# Patient Record
Sex: FEMALE | Race: WHITE | HISPANIC OR LATINO | URBAN - METROPOLITAN AREA
[De-identification: names, ages, dates, MRNs, and addresses within clinical notes are randomized per-mention and may not be internally consistent; named-entity substitution may affect disease eponyms.]

---

## 2018-01-16 ENCOUNTER — OUTPATIENT (OUTPATIENT)
Dept: OUTPATIENT SERVICES | Facility: HOSPITAL | Age: 63
LOS: 1 days | Discharge: HOME | End: 2018-01-16

## 2018-01-16 DIAGNOSIS — G51.0 BELL'S PALSY: ICD-10-CM

## 2018-01-16 DIAGNOSIS — R19.7 DIARRHEA, UNSPECIFIED: ICD-10-CM

## 2018-01-16 DIAGNOSIS — Z01.818 ENCOUNTER FOR OTHER PREPROCEDURAL EXAMINATION: ICD-10-CM

## 2018-01-16 DIAGNOSIS — A04.72 ENTEROCOLITIS DUE TO CLOSTRIDIUM DIFFICILE, NOT SPECIFIED AS RECURRENT: ICD-10-CM

## 2018-01-16 DIAGNOSIS — G97.1 OTHER REACTION TO SPINAL AND LUMBAR PUNCTURE: ICD-10-CM

## 2018-01-17 DIAGNOSIS — T85.44XD CAPSULAR CONTRACTURE OF BREAST IMPLANT, SUBSEQUENT ENCOUNTER: ICD-10-CM

## 2018-01-17 DIAGNOSIS — K21.9 GASTRO-ESOPHAGEAL REFLUX DISEASE WITHOUT ESOPHAGITIS: ICD-10-CM

## 2018-01-17 DIAGNOSIS — M19.90 UNSPECIFIED OSTEOARTHRITIS, UNSPECIFIED SITE: ICD-10-CM

## 2018-01-23 ENCOUNTER — OUTPATIENT (OUTPATIENT)
Dept: OUTPATIENT SERVICES | Facility: HOSPITAL | Age: 63
LOS: 1 days | Discharge: HOME | End: 2018-01-23

## 2018-01-29 DIAGNOSIS — F17.210 NICOTINE DEPENDENCE, CIGARETTES, UNCOMPLICATED: ICD-10-CM

## 2018-01-29 DIAGNOSIS — J44.9 CHRONIC OBSTRUCTIVE PULMONARY DISEASE, UNSPECIFIED: ICD-10-CM
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
## 2018-01-29 DIAGNOSIS — T85.44XA CAPSULAR CONTRACTURE OF BREAST IMPLANT, INITIAL ENCOUNTER: ICD-10-CM

## 2018-02-04 DIAGNOSIS — R19.7 DIARRHEA, UNSPECIFIED: ICD-10-CM

## 2018-02-04 DIAGNOSIS — G51.0 BELL'S PALSY: ICD-10-CM

## 2018-02-04 DIAGNOSIS — G97.1 OTHER REACTION TO SPINAL AND LUMBAR PUNCTURE: ICD-10-CM

## 2018-02-04 DIAGNOSIS — A04.72 ENTEROCOLITIS DUE TO CLOSTRIDIUM DIFFICILE, NOT SPECIFIED AS RECURRENT: ICD-10-CM

## 2019-02-23 ENCOUNTER — INPATIENT (INPATIENT)
Facility: HOSPITAL | Age: 64
LOS: 2 days | Discharge: HOME | End: 2019-02-26
Attending: INTERNAL MEDICINE | Admitting: INTERNAL MEDICINE

## 2019-02-23 VITALS
OXYGEN SATURATION: 98 % | TEMPERATURE: 97 F | WEIGHT: 134.92 LBS | DIASTOLIC BLOOD PRESSURE: 95 MMHG | HEIGHT: 63 IN | SYSTOLIC BLOOD PRESSURE: 142 MMHG | RESPIRATION RATE: 18 BRPM | HEART RATE: 88 BPM

## 2019-02-23 DIAGNOSIS — Z98.890 OTHER SPECIFIED POSTPROCEDURAL STATES: Chronic | ICD-10-CM

## 2019-02-23 DIAGNOSIS — Z90.710 ACQUIRED ABSENCE OF BOTH CERVIX AND UTERUS: Chronic | ICD-10-CM

## 2019-02-23 DIAGNOSIS — Z98.82 BREAST IMPLANT STATUS: Chronic | ICD-10-CM

## 2019-02-23 LAB
ALBUMIN SERPL ELPH-MCNC: 4.4 G/DL — SIGNIFICANT CHANGE UP (ref 3.5–5.2)
ALP SERPL-CCNC: 75 U/L — SIGNIFICANT CHANGE UP (ref 30–115)
ALT FLD-CCNC: 52 U/L — HIGH (ref 0–41)
ANION GAP SERPL CALC-SCNC: 16 MMOL/L — HIGH (ref 7–14)
AST SERPL-CCNC: 39 U/L — SIGNIFICANT CHANGE UP (ref 0–41)
BASOPHILS # BLD AUTO: 0.07 K/UL — SIGNIFICANT CHANGE UP (ref 0–0.2)
BASOPHILS NFR BLD AUTO: 0.8 % — SIGNIFICANT CHANGE UP (ref 0–1)
BILIRUB SERPL-MCNC: 0.2 MG/DL — SIGNIFICANT CHANGE UP (ref 0.2–1.2)
BUN SERPL-MCNC: 17 MG/DL — SIGNIFICANT CHANGE UP (ref 10–20)
CALCIUM SERPL-MCNC: 9.9 MG/DL — SIGNIFICANT CHANGE UP (ref 8.5–10.1)
CHLORIDE SERPL-SCNC: 102 MMOL/L — SIGNIFICANT CHANGE UP (ref 98–110)
CO2 SERPL-SCNC: 23 MMOL/L — SIGNIFICANT CHANGE UP (ref 17–32)
CREAT SERPL-MCNC: 0.8 MG/DL — SIGNIFICANT CHANGE UP (ref 0.7–1.5)
EOSINOPHIL # BLD AUTO: 0.14 K/UL — SIGNIFICANT CHANGE UP (ref 0–0.7)
EOSINOPHIL NFR BLD AUTO: 1.5 % — SIGNIFICANT CHANGE UP (ref 0–8)
GLUCOSE SERPL-MCNC: 104 MG/DL — HIGH (ref 70–99)
HCT VFR BLD CALC: 44.4 % — SIGNIFICANT CHANGE UP (ref 37–47)
HGB BLD-MCNC: 14.9 G/DL — SIGNIFICANT CHANGE UP (ref 12–16)
IMM GRANULOCYTES NFR BLD AUTO: 0.2 % — SIGNIFICANT CHANGE UP (ref 0.1–0.3)
LYMPHOCYTES # BLD AUTO: 3.54 K/UL — HIGH (ref 1.2–3.4)
LYMPHOCYTES # BLD AUTO: 38.5 % — SIGNIFICANT CHANGE UP (ref 20.5–51.1)
MAGNESIUM SERPL-MCNC: 2 MG/DL — SIGNIFICANT CHANGE UP (ref 1.8–2.4)
MCHC RBC-ENTMCNC: 33.2 PG — HIGH (ref 27–31)
MCHC RBC-ENTMCNC: 33.6 G/DL — SIGNIFICANT CHANGE UP (ref 32–37)
MCV RBC AUTO: 98.9 FL — SIGNIFICANT CHANGE UP (ref 81–99)
MONOCYTES # BLD AUTO: 0.8 K/UL — HIGH (ref 0.1–0.6)
MONOCYTES NFR BLD AUTO: 8.7 % — SIGNIFICANT CHANGE UP (ref 1.7–9.3)
NEUTROPHILS # BLD AUTO: 4.63 K/UL — SIGNIFICANT CHANGE UP (ref 1.4–6.5)
NEUTROPHILS NFR BLD AUTO: 50.3 % — SIGNIFICANT CHANGE UP (ref 42.2–75.2)
NRBC # BLD: 0 /100 WBCS — SIGNIFICANT CHANGE UP (ref 0–0)
PLATELET # BLD AUTO: 221 K/UL — SIGNIFICANT CHANGE UP (ref 130–400)
POTASSIUM SERPL-MCNC: 4.3 MMOL/L — SIGNIFICANT CHANGE UP (ref 3.5–5)
POTASSIUM SERPL-SCNC: 4.3 MMOL/L — SIGNIFICANT CHANGE UP (ref 3.5–5)
PROT SERPL-MCNC: 7.2 G/DL — SIGNIFICANT CHANGE UP (ref 6–8)
RBC # BLD: 4.49 M/UL — SIGNIFICANT CHANGE UP (ref 4.2–5.4)
RBC # FLD: 13 % — SIGNIFICANT CHANGE UP (ref 11.5–14.5)
SODIUM SERPL-SCNC: 141 MMOL/L — SIGNIFICANT CHANGE UP (ref 135–146)
TROPONIN T SERPL-MCNC: <0.01 NG/ML — SIGNIFICANT CHANGE UP
WBC # BLD: 9.2 K/UL — SIGNIFICANT CHANGE UP (ref 4.8–10.8)
WBC # FLD AUTO: 9.2 K/UL — SIGNIFICANT CHANGE UP (ref 4.8–10.8)

## 2019-02-23 RX ORDER — PANTOPRAZOLE SODIUM 20 MG/1
40 TABLET, DELAYED RELEASE ORAL
Qty: 0 | Refills: 0 | Status: DISCONTINUED | OUTPATIENT
Start: 2019-02-23 | End: 2019-02-26

## 2019-02-23 RX ORDER — SODIUM CHLORIDE 9 MG/ML
1000 INJECTION INTRAMUSCULAR; INTRAVENOUS; SUBCUTANEOUS ONCE
Qty: 0 | Refills: 0 | Status: COMPLETED | OUTPATIENT
Start: 2019-02-23 | End: 2019-02-23

## 2019-02-23 RX ORDER — ASPIRIN/CALCIUM CARB/MAGNESIUM 324 MG
81 TABLET ORAL DAILY
Qty: 0 | Refills: 0 | Status: DISCONTINUED | OUTPATIENT
Start: 2019-02-23 | End: 2019-02-26

## 2019-02-23 RX ORDER — ENOXAPARIN SODIUM 100 MG/ML
40 INJECTION SUBCUTANEOUS EVERY 24 HOURS
Qty: 0 | Refills: 0 | Status: DISCONTINUED | OUTPATIENT
Start: 2019-02-23 | End: 2019-02-26

## 2019-02-23 RX ORDER — ASPIRIN/CALCIUM CARB/MAGNESIUM 324 MG
325 TABLET ORAL ONCE
Qty: 0 | Refills: 0 | Status: COMPLETED | OUTPATIENT
Start: 2019-02-23 | End: 2019-02-23

## 2019-02-23 RX ADMIN — Medication 325 MILLIGRAM(S): at 20:08

## 2019-02-23 RX ADMIN — SODIUM CHLORIDE 1000 MILLILITER(S): 9 INJECTION INTRAMUSCULAR; INTRAVENOUS; SUBCUTANEOUS at 15:52

## 2019-02-23 RX ADMIN — SODIUM CHLORIDE 1000 MILLILITER(S): 9 INJECTION INTRAMUSCULAR; INTRAVENOUS; SUBCUTANEOUS at 14:39

## 2019-02-23 RX ADMIN — Medication 58 MILLIGRAM(S): at 15:55

## 2019-02-23 RX ADMIN — ENOXAPARIN SODIUM 40 MILLIGRAM(S): 100 INJECTION SUBCUTANEOUS at 21:46

## 2019-02-23 NOTE — H&P ADULT - NSHPSOCIALHISTORY_GEN_ALL_CORE
Marital Status:  (x )    (   ) Single    (   )    (  )   Occupation: retired  Lives with: (  ) alone  (  ) children   (x) spouse   (  ) parents  (  ) other    Substance Use (street drugs): (x) never used  (  ) other:  Tobacco Usage:  (   ) never smoked   (   ) former smoker   (4 cigarettes x40yrs) current smoker  (     ) pack year  (        ) last cigarette date  Alcohol Usage: occasional

## 2019-02-23 NOTE — ED PROVIDER NOTE - OBJECTIVE STATEMENT
62yo F with PMHx MS not on any meds p/w chest pressure associated with right arm and right neck pain that started today. Feels like something is stuck in her mid chest. Additionally notes that 3 days ago, began having right facial droop, blurry vision (found to be worse on right in ED), and in ED noted to have tongue deviation to the right. Pt did not come to ED initially for these symptoms because she has had similar in the past with her MS but does note it does not usually persist this long. Denies numbness, tingling, focal weakness, slurred speech. Pt is ex-smoker, states she has chronic cough. Denies fever, headache, lightheadedness, SOB, nausea, vomiting, diarrhea, abd pain, dysuria, hematuria, leg swelling.

## 2019-02-23 NOTE — ED PROVIDER NOTE - CARE PLAN
Principal Discharge DX:	Multiple sclerosis  Secondary Diagnosis:	Chest pain  Secondary Diagnosis:	Left bundle branch block

## 2019-02-23 NOTE — H&P ADULT - NSHPPHYSICALEXAM_GEN_ALL_CORE
T(F): 98 (02-23-19 @ 19:41), Max: 98 (02-23-19 @ 19:41)  HR: 67 (02-23-19 @ 19:41) (67 - 88)  BP: 128/59 (02-23-19 @ 19:41) (128/59 - 162/75)  RR: 18 (02-23-19 @ 19:41) (18 - 18)  SpO2: 98% (02-23-19 @ 19:41) (98% - 98%)  PHYSICAL EXAM:  GENERAL: NAD, speaks in full sentences, no signs of respiratory distress  HEAD:  R sided facial droop   EYES: EOMI, PERRLA, conjunctiva clear  NECK: Supple, No JVD  CHEST/LUNG: Clear to auscultation bilaterally; No wheeze; No crackles; No accessory muscles used  HEART: Regular rate and rhythm; No murmurs;   ABDOMEN: Soft, Nontender, Nondistended; Bowel sounds present; No guarding  EXTREMITIES:  2+ Peripheral Pulses, No cyanosis or edema,   PSYCH: AAOx3  NEUROLOGY: non-focal, 5/5 strength in all extremities, sensation intact, No nystagmus. No ataxia  SKIN: No rashes or lesions

## 2019-02-23 NOTE — H&P ADULT - ASSESSMENT
63Y F with pmh of multiple sclerosis presents to the ED with R sided facial droop for 3 days and chest pain for 1 day.    R sided facial droop with neck pain and blurry vision in R eye likely 2/2 MS exacerbation vs stroke  -Admit to telemetry  -Start on Methylprednisolone 1000mg q24h  -Neurology consulted  -Will get MRI brain and cervicale spine with and without gadolinium   -check lipids and if stroke is present on MRI will add statin to baby aspirin    Chest pain r/o ACS vs GERD  -EKG shows new LBBB  -CE x1 is negative. will repeat 2 more sets  -2 D echo  -Cardiology consult  -no current chest pain     DVT ppx  -lovenox    GI ppx  -since on high dose steroids will start protonix ppx    Dispo: Patient is from home.

## 2019-02-23 NOTE — ED PROVIDER NOTE - CLINICAL SUMMARY MEDICAL DECISION MAKING FREE TEXT BOX
pw facial droop and other cranial nerve deficits, along with chest pain. EKG shows new LBBB. Trop neg. Neuro recs noted. Patient to be admitted to an inpatient telemetry floor. Case discussed with and care endorsed to medical admitting resident. Admitting physician notified.

## 2019-02-23 NOTE — ED ADULT TRIAGE NOTE - CHIEF COMPLAINT QUOTE
blurry vision for 3 days with neck pain, right side facial droop that started 3 days ago and feels worse today with neck pain blurry vision for 3 days with neck pain, right side facial droop that started 3 days ago and feels worse today also states she has pain in her right arm that started 3 days ago as well

## 2019-02-23 NOTE — ED PROVIDER NOTE - NS ED ROS FT
Constitutional: No fever, chills.  Eyes:  No visual changes  ENMT:  +neck pain  Cardiac:  +chest pain  Respiratory:  No cough, SOB  GI:  No nausea, vomiting, diarrhea, abdominal pain.  :  No dysuria, hematuria  MS:  No back pain. +right arm pain  Neuro:  No headache or lightheadedness. +facial droop. See HPI  Skin:  No skin rash  Endocrine: No history of thyroid disease or diabetes.  Except as documented in the HPI,  all other systems are negative.

## 2019-02-23 NOTE — ED PROVIDER NOTE - PROGRESS NOTE DETAILS
ATTENDING NOTE:   62 y/o with PMH of MS, previously on injection medications however could not remain compliant due to side effects, no flares in prior >1 year, presenting with 3 days of right sided facial droop and pain radiating to right lateral neck to RUE. No numbness or tingling. Family notes no speech change. No fever, chills, dysuria, hematuria or vomiting. Pt is also c/o CP feeling like pressure and indigestion which has not resolved with Nexium taken at home. CP is associated with nausea and dizziness. PE: normal HEENT. Heart RRR. Lungs CTAB. Abdomen soft NTND. Extremities 2+ b/l pulses intact. Neuro (+) R facial partial paresis upper<lower with R tongue deviation. (+) decreased strength in right CN XI function. (+) neck paraspinal tenderness at lever C3-C4 on right. No midline tenderness. No step off. Normal trunk function. Otherwise normal speech, strength 5/5 all extremities, sensation grossly intact. No nystagmus. No ataxia. A/P: Concern for MS flare, given new neuro deficits, outside the window for stroke code and is less likely stroke. Eval ACS given chest pressure, nausea and light headedness. EKG showing new LBB compared to prior 1 year ago. Admission Spoke to Dr. Lee requesting IV Solumedrol 1g daily and MRI brain and c-spine.

## 2019-02-23 NOTE — ED ADULT NURSE NOTE - CHIEF COMPLAINT QUOTE
blurry vision for 3 days with neck pain, right side facial droop that started 3 days ago and feels worse today also states she has pain in her right arm that started 3 days ago as well

## 2019-02-23 NOTE — H&P ADULT - HISTORY OF PRESENT ILLNESS
63Y F with pmh of multiple sclerosis presents to the ED with R sided facial droop for 3 days and chest pain for 1 day. As per patient she was diagnosed with MS in 2013 when she developed facial droop. She follow with a neurologist in [place] and was given IV injections which patient says made her feel very sick so she stopped taking them for the past 2 years which was also the last time she saw her neurologist. Patient said the facial droop developed suddenly and was associated with R neck and arm pain. She developed blurry vision in the R eye as well yesterday. Her chest pain started like a heartburn for which she took nexium but it did not resolve the pain. The pain is pressure like and moves down the R arm and was worse with exertion which started yesterday. Her cardiologist is Dr Jackson and she had a stress test done 3 months ago which did not show any abnormality. On this admission EKG showed a LBBB which is new. Currently patient denies any chest pain. She denies any sob, nausea, vomiting, diarrhea, numbness, fevers or chills.

## 2019-02-23 NOTE — ED ADULT NURSE NOTE - OBJECTIVE STATEMENT
Patient present to ED with complains of right sided facial droop for about 2 days, with pain of right UE, denies slurred speech, nausea, vomiting, headaches, and dizziness.

## 2019-02-23 NOTE — H&P ADULT - ATTENDING COMMENTS
Patient is seen and examined independently at bedside. I agree with the resident's note, history and plan as above.    PHYSICAL EXAM:  CONSTITUTIONAL: NAD, well-groomed, well-developed.  HEAD:  Atraumatic, Normocephalic.  EYES: EOMI, PERRLA, conjunctiva and sclera clear. Right eye blurry vision. Right sided facial droop involving the makenna orbital area and lower part of face.  ENMT: Moist mucous membranes, No lesions. Right sided tongue deviation  NERVOUS SYSTEM:  Alert & Oriented X3, Good concentration; No limb weakness. Tingling present over face. Right hand tingling resolved as per patient.  RESPIRATORY: Clear to ascultation bilaterally; No rales, rhonchi, wheezing, or rubs.  CARDIOVASCULAR: Regular rate and rhythm; No murmurs, rubs, or gallops.  GASTROINTESTINAL: Soft, Nontender, Nondistended; Bowel sounds present.  MUSCULOSKELETAL: No joint swelling or tenderness. No neck or back pain.  EXTREMITIES: No clubbing, cyanosis, or edema.  LYMPH: No cervical lymphadenopathy noted.  SKIN: No rashes or lesions.    CXR reviewed, no focal opacity or pleural effusion    # MS exacerbation associated with right sided facial droop with neck pain and right eye blurry vision  - rule out stroke  - on telemetry  - solumedrol 1 g IV q24h for 3 days  - follow up neurology  - order for MRI brain and cervicale spine with and without gadolinium, 2D Echo  - c/w aspirin and statin    # Atypical Chest and arm pain, currently resolved   - r/o ACS vs. MS related symptoms vs. GERD  - EKG reviewed, normal sinus rhythm, new LBBB  - order for NM adenosine stress test for tomorrow, 2D Echo  - follow up Cardiology    # DVT Prophylaxis - on lovneox subcut    All labs, radiologic studies, vitals, orders and medications list reviewed.

## 2019-02-23 NOTE — H&P ADULT - NSHPLABSRESULTS_GEN_ALL_CORE
14.9   9.20  )-----------( 221      ( 23 Feb 2019 14:33 )             44.4       02-23    141  |  102  |  17  ----------------------------<  104<H>  4.3   |  23  |  0.8    Ca    9.9      23 Feb 2019 14:33  Mg     2.0     02-23    TPro  7.2  /  Alb  4.4  /  TBili  0.2  /  DBili  x   /  AST  39  /  ALT  52<H>  /  AlkPhos  75  02-23          CARDIAC MARKERS ( 23 Feb 2019 14:33 )  x     / <0.01 ng/mL / x     / x     / x

## 2019-02-23 NOTE — ED PROVIDER NOTE - PHYSICAL EXAMINATION
Vital signs reviewed  GENERAL: Patient nontoxic appearing, NAD  HEAD: NCAT  EYES: Anicteric. PERRL, EOMI. Visual acuity 20/30 on right and 20/20 on left.   ENT: MMM  NECK: Supple. Mild right paraspinal TTP, no midline TTP  RESPIRATORY: Normal respiratory effort. CTA B/L. No wheezing, rales, rhonchi  CARDIOVASCULAR: Regular rate and rhythm  ABDOMEN: Soft. Nondistended. Nontender. No guarding or rebound. No CVA tenderness  MUSCULOSKELETAL/EXTREMITIES: Brisk cap refill. 2+ radial pulses. No leg edema.  SKIN:  Warm and dry  NEURO: AAOx3. GCS 15. Speech clear and coherent. Answering questions appropriately. Slight right facial droop. Right tongue deviation. Strength 5/5 x4, no drift. Ambulating normally, no gait abnormality. No gross FND.   PSYCHIATRIC: Cooperative. Affect appropriate.

## 2019-02-24 LAB
CHOLEST SERPL-MCNC: 239 MG/DL — HIGH (ref 100–200)
CK MB CFR SERPL CALC: 1.8 NG/ML — SIGNIFICANT CHANGE UP (ref 0.6–6.3)
CK MB CFR SERPL CALC: 2 NG/ML — SIGNIFICANT CHANGE UP (ref 0.6–6.3)
CK SERPL-CCNC: 79 U/L — SIGNIFICANT CHANGE UP (ref 0–225)
CK SERPL-CCNC: 82 U/L — SIGNIFICANT CHANGE UP (ref 0–225)
HDLC SERPL-MCNC: 74 MG/DL — SIGNIFICANT CHANGE UP
LIPID PNL WITH DIRECT LDL SERPL: 172 MG/DL — HIGH (ref 4–129)
TOTAL CHOLESTEROL/HDL RATIO MEASUREMENT: 3.2 RATIO — LOW (ref 4–5.5)
TRIGL SERPL-MCNC: 69 MG/DL — SIGNIFICANT CHANGE UP (ref 10–149)
TROPONIN T SERPL-MCNC: <0.01 NG/ML — SIGNIFICANT CHANGE UP
TROPONIN T SERPL-MCNC: <0.01 NG/ML — SIGNIFICANT CHANGE UP

## 2019-02-24 RX ORDER — ACETAMINOPHEN 500 MG
650 TABLET ORAL EVERY 6 HOURS
Qty: 0 | Refills: 0 | Status: DISCONTINUED | OUTPATIENT
Start: 2019-02-24 | End: 2019-02-26

## 2019-02-24 RX ORDER — ADENOSINE 3 MG/ML
60 INJECTION INTRAVENOUS ONCE
Qty: 0 | Refills: 0 | Status: DISCONTINUED | OUTPATIENT
Start: 2019-02-24 | End: 2019-02-24

## 2019-02-24 RX ORDER — ADENOSINE 3 MG/ML
60 INJECTION INTRAVENOUS ONCE
Qty: 0 | Refills: 0 | Status: COMPLETED | OUTPATIENT
Start: 2019-02-25 | End: 2019-02-25

## 2019-02-24 RX ORDER — LANOLIN ALCOHOL/MO/W.PET/CERES
3 CREAM (GRAM) TOPICAL ONCE
Qty: 0 | Refills: 0 | Status: COMPLETED | OUTPATIENT
Start: 2019-02-24 | End: 2019-02-24

## 2019-02-24 RX ORDER — ATORVASTATIN CALCIUM 80 MG/1
20 TABLET, FILM COATED ORAL AT BEDTIME
Qty: 0 | Refills: 0 | Status: DISCONTINUED | OUTPATIENT
Start: 2019-02-24 | End: 2019-02-26

## 2019-02-24 RX ADMIN — Medication 30 MILLILITER(S): at 22:57

## 2019-02-24 RX ADMIN — Medication 81 MILLIGRAM(S): at 12:06

## 2019-02-24 RX ADMIN — Medication 650 MILLIGRAM(S): at 09:56

## 2019-02-24 RX ADMIN — Medication 650 MILLIGRAM(S): at 10:26

## 2019-02-24 RX ADMIN — ATORVASTATIN CALCIUM 20 MILLIGRAM(S): 80 TABLET, FILM COATED ORAL at 21:35

## 2019-02-24 RX ADMIN — PANTOPRAZOLE SODIUM 40 MILLIGRAM(S): 20 TABLET, DELAYED RELEASE ORAL at 08:08

## 2019-02-24 RX ADMIN — ENOXAPARIN SODIUM 40 MILLIGRAM(S): 100 INJECTION SUBCUTANEOUS at 21:35

## 2019-02-24 RX ADMIN — Medication 1 MILLIGRAM(S): at 15:48

## 2019-02-24 RX ADMIN — Medication 58 MILLIGRAM(S): at 07:13

## 2019-02-24 RX ADMIN — Medication 3 MILLIGRAM(S): at 23:00

## 2019-02-24 NOTE — CONSULT NOTE ADULT - ASSESSMENT
chest pain, atypical but in a post menopause female, smoker, with high LDL of 175 and early death of father, she has a limited functional capacity due to MS, R/O CAD  New and brief LBBB: along with the above, r/o CAD, r/o conduction abnormality due to MS and the age?  hyperlipidemia  GERD, dyspepsia    start Atorvastatin 20 mg daily  continue with ASA, SQ Lovenox  Adenosine nuclear stress test, I spoke to her and her  in detail about the options of cardiac cath (invasive) vs stress nuclear (non invasive approach)  f/b dr Jackson

## 2019-02-24 NOTE — CONSULT NOTE ADULT - ATTENDING COMMENTS
Patient examined last night and is likely having MS exacerbation.  She should be given 3 days of IV Solumedrol 1,000 mg/day along with GI prophylaxis and undergo brain and cervical spine MRI with and w/o contrast.  She will make f/u appointment with me in 2-3 weeks.

## 2019-02-24 NOTE — CONSULT NOTE ADULT - ASSESSMENT
Problem: NICU 32-33 weeks: Week 2 of Life (Days of Life 7-14)  Goal: Diagnostic Test/Procedures  Outcome: Progressing Towards Goal  No new labs ordered for tonight. Goal: Nutrition/Diet  Outcome: Progressing Towards Goal  PO feeding fairly well. Goal: *Family participates in care and asks appropriate questions  Outcome: Progressing Towards Goal  Parents are involved in infant's care. 64YO Right handed Female  with pmh of multiple sclerosis not compliant with medications infact not taking any medications and no neuro follow up since past 2 years presented with Right sided facial droop for 3 days.     DDX:        1. MS exacerbation         2. Stroke      Plan  MRI BRAIN AND C SPINE W W/O CONTRAST   continue solumedrol 1000mg for a total of 3 days 64YO Right handed Female  with pmh of multiple sclerosis not compliant with medications infact not taking any medications and no neuro follow up since past 2 years presented with Right sided facial droop for 3 days.     DDX:        1. MS exacerbation         2. Stroke      Plan  MRI BRAIN AND C SPINE W W/O CONTRAST   lipid profile and hbaic  echo  continue solumedrol 1000mg for a total of 3 days

## 2019-02-24 NOTE — PATIENT PROFILE ADULT - NSPROGENBLOODRESTRICT_GEN_A_NUR
I have personally seen and examined this patient.  I have fully participated in the care of this patient. I have reviewed all pertinent clinical information, including history, physical exam, plan and the Resident’s note and agree except as noted.
none

## 2019-02-24 NOTE — CONSULT NOTE ADULT - SUBJECTIVE AND OBJECTIVE BOX
Patient is a 63y old  Female who presents with a chief complaint of R facial drop and chest pain (2019 05:49)      HPI: I was called for the presentation of chest pain, over the chest and right axiliary to neck, on and off, lasts for ours, occasionally responds to anti acids, admits having reflux, pain is going on for a long time but in the last few has had a more severe pain, last year had a stress echo for the chest pain and a 4 minutes stress echo resulted normal,  she is smoker, admits having lots of stress and anxiety and regardless of normal HDL>70, LDL also resulted very high 175     63Y F with pmh of multiple sclerosis presents to the ED with R sided facial droop for 3 days and chest pain for 1 day. As per patient she was diagnosed with MS in  when she developed facial droop. She follow with a neurologist in [place] and was given IV injections which patient says made her feel very sick so she stopped taking them for the past 2 years which was also the last time she saw her neurologist. Patient said the facial droop developed suddenly and was associated with R neck and arm pain. She developed blurry vision in the R eye as well yesterday. Her chest pain started like a heartburn for which she took nexium but it did not resolve the pain. The pain is pressure like and moves down the R arm and was worse with exertion which started yesterday. Her cardiologist is Dr Jackson and she had a stress test done 3 months ago which did not show any abnormality. On this admission EKG showed a LBBB which is new. Currently patient denies any chest pain. She denies any sob, nausea, vomiting, diarrhea, numbness, fevers or chills. (2019 20:03)      PAST MEDICAL & SURGICAL HISTORY:  Multiple sclerosis  History of facial surgery  H/O breast augmentation  H/O: hysterectomy      PREVIOUS DIAGNOSTIC TESTING:      ECHO  FINDINGS: in the office, normal valves, EF not more than 50% at rest an about 55% by 4 minutes exercise    STRESS TEST  FINDINGS: in 2018 4 minutes stress echo, EF at rest 50%, no ischemia, in limited FC      MEDICATIONS  (STANDING):  aspirin  chewable 81 milliGRAM(s) Oral daily  enoxaparin Injectable 40 milliGRAM(s) SubCutaneous every 24 hours  methylPREDNISolone sodium succinate IVPB 1000 milliGRAM(s) IV Intermittent daily  pantoprazole    Tablet 40 milliGRAM(s) Oral before breakfast    MEDICATIONS  (PRN):  acetaminophen   Tablet .. 650 milliGRAM(s) Oral every 6 hours PRN Mild Pain (1 - 3)  LORazepam   Injectable 1 milliGRAM(s) IV Push once PRN Anxiety      FAMILY HISTORY:  Family history of stroke (Mother)  Father  at 50 after a fever      SOCIAL HISTORY:  CIGARETTES: active daily smoker  ALCOHOL: social  DRUGS: no                      REVIEW OF SYSTEMS:  CONSTITUTIONAL: No distress, Looks stable  NECK: No pain  RESPIRATORY: No cough, wheezing, shortness of breath  CARDIOVASCULAR: chest pain, occasional SOB, NO palpitations, leg swelling  GASTROINTESTINAL: occasional abdominal and epigastric pain, dyspepsia, reflux, but NO nausea, vomiting, or hematemesis;  No melena.  NEUROLOGICAL: No dizziness, headaches, memory loss, loss of strength  SKIN: No itching, burning, rashes, or lesions   ENDOCRINE: No heat or cold intolerance  MUSCULOSKELETAL: No joint pain, No  swelling; No muscle pain  PSYCHIATRIC: some depression and  anxiety, no mood swings, or difficulty sleeping  ALLERGY: No hives, itching, rash          Vital Signs Last 24 Hrs  T(C): 36.7 (2019 06:18), Max: 36.7 (2019 19:41)  T(F): 98 (2019 06:18), Max: 98 (2019 19:41)  HR: 63 (2019 06:18) (63 - 88)  BP: 109/60 (2019 06:18) (109/60 - 162/75)  BP(mean): --  RR: 18 (2019 06:18) (18 - 18)  SpO2: 98% (2019 06:18) (97% - 98%)                      PHYSICAL EXAM:  GENERAL: No distress, well developed  HEAD:  Atraumatic, Normocephalic  NECK: Supple, No JVD, No Bruit of either carotid arteries  NERVOUS SYSTEM:  Alert, Awake, Oriented to time, place, person; Normal memory and speech; Normal motor Strength 5/5 B/L upper and lower extremities  CHEST/LUNG: Normal air entry to lung base bilaterally; No wheeze, crackle, rales, rhonchi  HEART: Regular heart beat, S1, A2, P2, No S3, No S4, No gallop, No murmur  ABDOMEN: Soft, Non tender, Non distended; Bowel sounds present  EXTREMITIES:  2+ Peripheral Pulses, No clubbing, No edema  SKIN: No rashes or lesions    TELEMETRY: NSR    ECG: NSR, normal axis, LBBB in standard leads and V1-V3 then suddenly narrowed to normal conduction, 1 pvc  on monitor this morning QRS is narrow, no more LBBB    I&O's Detail      LABS:                        14.9   9.20  )-----------( 221      ( 2019 14:33 )             44.4         141  |  102  |  17  ----------------------------<  104<H>  4.3   |  23  |  0.8    Ca    9.9      2019 14:33  Mg     2.0         TPro  7.2  /  Alb  4.4  /  TBili  0.2  /  DBili  x   /  AST  39  /  ALT  52<H>  /  AlkPhos  75      CARDIAC MARKERS ( 2019 07:57 )  x     / <0.01 ng/mL / 82 U/L / x     / 1.8 ng/mL  CARDIAC MARKERS ( 2019 00:19 )  x     / <0.01 ng/mL / 79 U/L / x     / 2.0 ng/mL  CARDIAC MARKERS ( 2019 14:33 )  x     / <0.01 ng/mL / x     / x     / x              I&O's Summary      RADIOLOGY & ADDITIONAL STUDIES: < from: Xray Chest 2 Views PA/Lat (19 @ 16:13) >  Impression:      No radiographic evidence of acute cardiopulmonary disease.    < end of copied text >  < from: CT Head No Cont (19 @ 18:15) >  1.  Mild periventricular and subcortical white matter chronic small   vessel ischemic changes.    2.  No acute mass effect, midline shift or hemorrhage.      3.  If the patient continues to be symptomatic follow-up MRI of the brain   may be helpful for further evaluation.      < end of copied text >
CC:  RIGHT FACIAL DROOP      HPI:   62YO Right handed Female  with pmh of multiple sclerosis presents to the ED with Right sided facial droop for 3 days and chest pain for 1 day. As per patient she was diagnosed with MS in . She used to  follow with a neurologist in [place] and was initially started on Tecfidera which was later changed to given injections q weekly and patient does not remember the name of that medication, which made her feel very sick so she stopped taking them for the past 2 years which was also the last time she saw her neurologist. Patient said the facial droop developed suddenly 3 days ago and was associated with Rt neck and arm pain. She developed blurry vision in the Rt eye>Left eye as well yesterday. Patient denies speech visual deficits, n/v dizziness headache extremity weakness or difficulty swallowing. Last mri was 3 yrs ago.    Home Medications:  aspirin 81 mg oral tablet: q 24  Vitamin D3 1000 intl units oral tablet: q 24  vitamin E 100 intl units oral capsule: q 24    Social history  + smoker 4-5 cig /day  denies alcohol and drug use    Family History  Mother with Alzheimer's  Father  with Heart disease    Neuro Exam:  Orientation: oriented to person, place time and situation, speech fluent, normal comprehension  Cranial Nerves: Right facial droop                         Right ptosis                         Tongue deviated to the right                         Forehead symmetric  Motor: 5/5 throughout/ no drift  Sensory exam: intact  Coordination:. No dysmetria or limb ataxia  Deep tendon reflexes: 2+/4 uppers                                    brisk on the lowers  Gait: steady    NIHSS: 2 (partial right facial droop)    Allergies    ampicillin (Other)    Intolerances      MEDICATIONS  (STANDING):  aspirin  chewable 81 milliGRAM(s) Oral daily  enoxaparin Injectable 40 milliGRAM(s) SubCutaneous every 24 hours  methylPREDNISolone sodium succinate IVPB 1000 milliGRAM(s) IV Intermittent daily  pantoprazole    Tablet 40 milliGRAM(s) Oral before breakfast    MEDICATIONS  (PRN):      LABS:                        14.9   9.20  )-----------( 221      ( 2019 14:33 )             44.4     02-23    141  |  102  |  17  ----------------------------<  104<H>  4.3   |  23  |  0.8    Ca    9.9      2019 14:33  Mg     2.0     02-23    TPro  7.2  /  Alb  4.4  /  TBili  0.2  /  DBili  x   /  AST  39  /  ALT  52<H>  /  AlkPhos  75              Neuro Imaging:  < from: CT Head No Cont (19 @ 18:15) >  Findings: The ventricles, basal cisterns and sulcal pattern are slightly   prominent consistent with parenchymal volume loss. There are scattered   punctate foci of hypodensities in the periventricular and subcortical   white matter which are nonspecific and without mass effect most likely   consistent with chronic small vessel ischemic changes in a patient of   this stated age. There is no acute mass effect, midline shift or   hemorrhage. No extra-axial fluid collections are identified.    The bones of the calvarium are intact. The paranasal sinuses, bilateral   mastoid complexes and globes and orbits are grossly unremarkable.    Beam hardening artifact is noted overlying the brain stem and posterior   fossa which is inherent to CT in this location.      IMPRESSION:     1.  Mild periventricular and subcortical white matter chronic small   vessel ischemic changes.    2.  No acute mass effect, midline shift or hemorrhage.        < end of copied text >    EEG:     Echo:   Carotid Doppler: N/A  Telemetry:

## 2019-02-25 LAB
TROPONIN T SERPL-MCNC: <0.01 NG/ML — SIGNIFICANT CHANGE UP
TROPONIN T SERPL-MCNC: <0.01 NG/ML — SIGNIFICANT CHANGE UP

## 2019-02-25 RX ORDER — ZOLPIDEM TARTRATE 10 MG/1
5 TABLET ORAL ONCE
Qty: 0 | Refills: 0 | Status: DISCONTINUED | OUTPATIENT
Start: 2019-02-25 | End: 2019-02-26

## 2019-02-25 RX ADMIN — PANTOPRAZOLE SODIUM 40 MILLIGRAM(S): 20 TABLET, DELAYED RELEASE ORAL at 14:44

## 2019-02-25 RX ADMIN — ENOXAPARIN SODIUM 40 MILLIGRAM(S): 100 INJECTION SUBCUTANEOUS at 21:24

## 2019-02-25 RX ADMIN — ADENOSINE 2400 MILLIGRAM(S): 3 INJECTION INTRAVENOUS at 10:27

## 2019-02-25 RX ADMIN — Medication 58 MILLIGRAM(S): at 05:40

## 2019-02-25 RX ADMIN — Medication 81 MILLIGRAM(S): at 12:41

## 2019-02-25 RX ADMIN — ATORVASTATIN CALCIUM 20 MILLIGRAM(S): 80 TABLET, FILM COATED ORAL at 21:24

## 2019-02-25 NOTE — PROGRESS NOTE ADULT - SUBJECTIVE AND OBJECTIVE BOX
Patient seen and examined-     63 year old woman - known hist of MS since 2013_ has been on TEcfidera- caused significant Gi symptoms-   then on Copaxone- caused fatigue/ flu like illness-  currently not on any meds-  admitted with worsening rt facial palsy-    MRI C spine and brain with and without contrast showed no new lesions-  explained all above to patient  facial palsy is recovering-  no motor deficit-    patient will follow up with Dr Power in [facility]

## 2019-02-25 NOTE — PROGRESS NOTE ADULT - ASSESSMENT
63Y F with pmh of multiple sclerosis presents to the ED with R sided facial droop for 3 days and chest pain for 1 day.    # Likely MS exacerbation  - IV Solumedrol per neuro (day 2/3)  - MRI - motion limited study, no new enhancing lesions    # CP  - Stress test pending  - ASA/Lipitor    # GI ppx: protonix  # DVTppx: Lovenox

## 2019-02-25 NOTE — ED ADULT NURSE REASSESSMENT NOTE - NS ED NURSE REASSESS COMMENT FT1
pt c/o sudden onset of right sided CP. MD made aware. STAT EKG called and obtained. Pt with recent downgrade from tele. Pt placed back on the monitor. VS obtained. MD will come evaluate pt, Awaiting recommendations

## 2019-02-25 NOTE — PROGRESS NOTE ADULT - SUBJECTIVE AND OBJECTIVE BOX
INTERVAL HISTORY: No overnight events.  	  MEDICATIONS:  acetaminophen   Tablet .. 650 milliGRAM(s) Oral every 6 hours PRN  adenosine Injectable (ADENOSCAN) 60 milliGRAM(s) IV Bolus once  aluminum hydroxide/magnesium hydroxide/simethicone Suspension 30 milliLiter(s) Oral every 4 hours PRN  aspirin  chewable 81 milliGRAM(s) Oral daily  atorvastatin 20 milliGRAM(s) Oral at bedtime  enoxaparin Injectable 40 milliGRAM(s) SubCutaneous every 24 hours  methylPREDNISolone sodium succinate IVPB 1000 milliGRAM(s) IV Intermittent daily  pantoprazole    Tablet 40 milliGRAM(s) Oral before breakfast      REVIEW OF SYSTEMS:  CONSTITUTIONAL: No fever, weight loss, or fatigue  NECK: No pain or stiffness  RESPIRATORY: No cough, wheezing, chills or hemoptysis; No shortness of breath  CARDIOVASCULAR: No chest pain, palpitations, dizziness, or leg swelling  GASTROINTESTINAL: No abdominal or epigastric pain. No nausea, vomiting, or hematemesis; No diarrhea or constipation. No melena or hematochezia.  GENITOURINARY: No dysuria, frequency, hematuria, or incontinence  NEUROLOGICAL: No headaches, memory loss, loss of strength, numbness, or tremors  SKIN: No itching, burning, rashes, or lesions   ENDOCRINE: No heat or cold intolerance; No hair loss  MUSCULOSKELETAL: No joint pain or swelling; No muscle, back, or extremity pain  HEME/LYMPH: No easy bruising, or bleeding gums    PHYSICAL EXAM:  T(C): 35.9 (02-25-19 @ 08:41), Max: 36.3 (02-24-19 @ 23:10)  HR: 77 (02-25-19 @ 08:41) (77 - 89)  BP: 133/79 (02-25-19 @ 08:41) (121/58 - 133/79)  RR: 18 (02-25-19 @ 08:41) (18 - 18)  SpO2: 98% (02-25-19 @ 08:41) (96% - 98%)  Wt(kg): --  I&O's Summary        GENERAL: NAD, well-groomed, well-developed  HEAD:  Atraumatic, Normocephalic  NECK: Supple, No JVD, Normal thyroid  NERVOUS SYSTEM:  Alert & Oriented X3, Good concentration  CHEST/LUNG: Clear to percussion bilaterally; No rales, rhonchi, wheezing, or rubs  HEART: Regular rate and rhythm; No murmurs, rubs, or gallops  ABDOMEN: Soft, Nontender, Nondistended; Bowel sounds present  EXTREMITIES:  2+ Peripheral Pulses, No clubbing, cyanosis, or edema  SKIN: No rashes or lesions    LABS:	 	    CARDIAC MARKERS ( 24 Feb 2019 07:57 )  x     / <0.01 ng/mL / 82 U/L / x     / 1.8 ng/mL  CARDIAC MARKERS ( 24 Feb 2019 00:19 )  x     / <0.01 ng/mL / 79 U/L / x     / 2.0 ng/mL  CARDIAC MARKERS ( 23 Feb 2019 14:33 )  x     / <0.01 ng/mL / x     / x     / x                                14.9   9.20  )-----------( 221      ( 23 Feb 2019 14:33 )             44.4     02-23    141  |  102  |  17  ----------------------------<  104<H>  4.3   |  23  |  0.8    Ca    9.9      23 Feb 2019 14:33  Mg     2.0     02-23    TPro  7.2  /  Alb  4.4  /  TBili  0.2  /  DBili  x   /  AST  39  /  ALT  52<H>  /  AlkPhos  75  02-23    proBNP:   Lipid Profile:

## 2019-02-25 NOTE — PROGRESS NOTE ADULT - SUBJECTIVE AND OBJECTIVE BOX
SUBJECTIVE:    Patient is a 63y old Female who presents with a chief complaint of R facial drop and chest pain (25 Feb 2019 11:13)    Today is hospital day 2d. This morning she is resting comfortably in bed and reports no new issues or overnight events.     PAST MEDICAL & SURGICAL HISTORY  Multiple sclerosis  History of facial surgery  H/O breast augmentation  H/O: hysterectomy    SOCIAL HISTORY:  Negative for smoking/alcohol/drug use.     ALLERGIES:  ampicillin (Other)    MEDICATIONS:  STANDING MEDICATIONS  aspirin  chewable 81 milliGRAM(s) Oral daily  atorvastatin 20 milliGRAM(s) Oral at bedtime  enoxaparin Injectable 40 milliGRAM(s) SubCutaneous every 24 hours  methylPREDNISolone sodium succinate IVPB 1000 milliGRAM(s) IV Intermittent daily  pantoprazole    Tablet 40 milliGRAM(s) Oral before breakfast    PRN MEDICATIONS  acetaminophen   Tablet .. 650 milliGRAM(s) Oral every 6 hours PRN  aluminum hydroxide/magnesium hydroxide/simethicone Suspension 30 milliLiter(s) Oral every 4 hours PRN    VITALS:   T(F): 96.7  HR: 77  BP: 133/79  RR: 18  SpO2: 98%    LABS:                        14.9   9.20  )-----------( 221      ( 23 Feb 2019 14:33 )             44.4     02-23    141  |  102  |  17  ----------------------------<  104<H>  4.3   |  23  |  0.8    Ca    9.9      23 Feb 2019 14:33  Mg     2.0     02-23    TPro  7.2  /  Alb  4.4  /  TBili  0.2  /  DBili  x   /  AST  39  /  ALT  52<H>  /  AlkPhos  75  02-23              CARDIAC MARKERS ( 24 Feb 2019 07:57 )  x     / <0.01 ng/mL / 82 U/L / x     / 1.8 ng/mL  CARDIAC MARKERS ( 24 Feb 2019 00:19 )  x     / <0.01 ng/mL / 79 U/L / x     / 2.0 ng/mL  CARDIAC MARKERS ( 23 Feb 2019 14:33 )  x     / <0.01 ng/mL / x     / x     / x          RADIOLOGY:    PHYSICAL EXAM:  GEN: No acute distress  LUNGS: Clear to auscultation bilaterally   HEART: S1/S2 present. RRR.   ABD: Soft, non-tender, non-distended. Bowel sounds present  EXT: NC/NC/NE/HERNANDES  NEURO: AAOX3

## 2019-02-25 NOTE — PROGRESS NOTE ADULT - ASSESSMENT
63Y F with PMHof multiple sclerosis presents to the ED with R sided facial droop for 3 days and chest pain for 1 day.    Multiple Sclerosis flare:   patient presented with right facial droop  Brain MRI showed Multiple foci of white matter signal abnormality, stable compared to the previous brain MRI dated 8/9/2013.  Cervical MRI: Motion limited study. Suggestion of spinal cord signal abnormality at C4 and C5, which was more pronounced on the prior exam from 2013. Stable degenerative changes at C4-5 and C5-6 with mild spinal and moderate bilateral foraminal stenosis.  Neurology recommended Solu-Medrol IV 1g for 3 days (day#2)          Chest pain: improved  EKG showed intermittent LBBB. Serial troponin x3 negative  Nuclear stress negative for ischemia  Discontinue Telemetry   Continue ASA and Lipitor.     Chronic tobacco abuse:   Counseled for smoking cessation  She declined Nicotine patch, states she has skin allergy from the patch.     GI ppx: protonix   DVTppx: Lovenox    Progress Note Handoff:  Pending (specify):  to complete last dose of Solu-Medrol tomorrow.   Family discussion: yes with her  at bedside.   Disposition: Home, likely tomorrow.

## 2019-02-26 ENCOUNTER — TRANSCRIPTION ENCOUNTER (OUTPATIENT)
Age: 64
End: 2019-02-26

## 2019-02-26 VITALS
DIASTOLIC BLOOD PRESSURE: 62 MMHG | WEIGHT: 143.96 LBS | RESPIRATION RATE: 19 BRPM | TEMPERATURE: 97 F | HEART RATE: 62 BPM | SYSTOLIC BLOOD PRESSURE: 120 MMHG

## 2019-02-26 LAB — TROPONIN T SERPL-MCNC: <0.01 NG/ML — SIGNIFICANT CHANGE UP

## 2019-02-26 RX ADMIN — ZOLPIDEM TARTRATE 5 MILLIGRAM(S): 10 TABLET ORAL at 00:11

## 2019-02-26 RX ADMIN — Medication 58 MILLIGRAM(S): at 06:45

## 2019-02-26 RX ADMIN — PANTOPRAZOLE SODIUM 40 MILLIGRAM(S): 20 TABLET, DELAYED RELEASE ORAL at 06:45

## 2019-02-26 RX ADMIN — Medication 81 MILLIGRAM(S): at 11:38

## 2019-02-26 NOTE — PROGRESS NOTE ADULT - SUBJECTIVE AND OBJECTIVE BOX
STAN ESCOBAR  63y Female    CHIEF COMPLAINT:    Patient is a 63y old  Female who presents with a chief complaint of R facial drop and chest pain (2019 16:18)      INTERVAL HPI/OVERNIGHT EVENTS:    Patient seen and examined.    ROS: All other systems are negative.    Vital Signs:    T(F): 96.7 (19 @ 04:32), Max: 97 (19 @ 21:07)  HR: 62 (19 @ 04:32) (62 - 78)  BP: 120/62 (19 @ 04:32) (120/62 - 162/97)  RR: 19 (19 @ 04:32) (18 - 19)  SpO2: 98% (19 @ 21:07) (98% - 98%)  I&O's Summary    Daily Height in cm: 160.02 (2019 23:06)    Daily Weight in k.3 (2019 04:32)  CAPILLARY BLOOD GLUCOSE          PHYSICAL EXAM:    GENERAL:  NAD  SKIN: No rashes or lesions  HENT: Atrumatic. Normocephalic. PERRL. Moist membranes.  NECK: Supple, No JVD. No lymphadenopathy.  PULMONARY: CTA B/L. No wheezing. No rales  CVS: Normal S1, S2. Rate and Rythm are regular. No murmurs.  ABDOMEN/GI: Soft, Nontender, Nondistended; BS present  EXTREMITIES: Peripheral pulses intact. No edema B/L LE.  NEUROLOGIC:  No motor or sensory deficit.  PSYCH: Alert & oriented x 3    Consultant(s) Notes Reviewed:  [x ] YES  [ ] NO  Care Discussed with Consultants/Other Providers [ x] YES  [ ] NO    EKG reviewed  Telemetry reviewed    LABS:              Trop <0.01, CKMB --, CK --, 19 @ 02:05  Trop <0.01, CKMB --, CK --, 19 @ 21:31  Trop <0.01, CKMB --, CK --, 19 @ 18:25  Trop <0.01, CKMB 1.8, CK 82, 19 @ 07:57  Trop <0.01, CKMB 2.0, CK 79, 19 @ 00:19  Trop <0.01, CKMB --, CK --, 19 @ 14:33        RADIOLOGY & ADDITIONAL TESTS:    < from: NM Nuclear Stress Pharmacologic Multiple (19 @ 12:03) >    IMPRESSION:     1. NO DEFINITE EVIDENCE FOR ISCHEMIA DURING ADENOSINE INFUSION AS   DESCRIBED ABOVE..     2. NORMAL RESTING LEFT VENTRICULAR WALL MOTION AND WALL THICKENING.    3. LEFT VENTRICULAR EJECTION FRACTION OF  73 % WHICH IS WITHIN RANGE OF   NORMAL.     < end of copied text >  < from: MR Head w/wo IV Cont (19 @ 17:29) >  There is normal flow void present within the major vascular structures.      IMPRESSION:     1.  Multiple discrete foci of white matter signal abnormality, stable   compared to the previous brain MRI dated 2013.    2.  No new or enhancing lesions are demonstrated.    < end of copied text >  < from: MR Cervical Spine w/wo IV Cont (19 @ 17:30) >    IMPRESSION:    1.  Motion limited study. Suggestion of spinal cord signal abnormality at   C4 and C5, which was more pronounced on the prior exam from  but this   may reflect motion artifact on the current exam. Consider repeat study as   clinically warranted.    2.  Stable degenerative changes at C4-5 and C5-6 with mild spinal and   moderate bilateral foraminal stenosis.        < end of copied text >    Imaging or report Personally Reviewed:  [ ] YES  [ ] NO    Medications:  Standing  aspirin  chewable 81 milliGRAM(s) Oral daily  atorvastatin 20 milliGRAM(s) Oral at bedtime  enoxaparin Injectable 40 milliGRAM(s) SubCutaneous every 24 hours  methylPREDNISolone sodium succinate IVPB 1000 milliGRAM(s) IV Intermittent daily  pantoprazole    Tablet 40 milliGRAM(s) Oral before breakfast    PRN Meds  acetaminophen   Tablet .. 650 milliGRAM(s) Oral every 6 hours PRN  aluminum hydroxide/magnesium hydroxide/simethicone Suspension 30 milliLiter(s) Oral every 4 hours PRN      Case discussed with resident    Care discussed with pt/family

## 2019-02-26 NOTE — PROGRESS NOTE ADULT - REASON FOR ADMISSION
R facial drop and chest pain

## 2019-02-26 NOTE — PROGRESS NOTE ADULT - SUBJECTIVE AND OBJECTIVE BOX
Hospital Day:  3d    Subjective:    Patient is a 63y old  Female who presents with a chief complaint of R facial drop and chest pain (26 Feb 2019 10:43)    No overnight events. Seen and examined at bedside. Patient reported no acute complaints and wants to go home today. She was seen and examined with attending present and agree that she is medically stable for discharge. Ten point review of systems was otherwise negative.     Telemetry reviewed: NSR 60-70s. No events     Past Medical Hx:   Multiple sclerosis    Past Sx:  History of facial surgery  H/O breast augmentation  H/O: hysterectomy    Allergies:  ampicillin (Other)    Current Meds:   Standng Meds:  aspirin  chewable 81 milliGRAM(s) Oral daily  atorvastatin 20 milliGRAM(s) Oral at bedtime  enoxaparin Injectable 40 milliGRAM(s) SubCutaneous every 24 hours  methylPREDNISolone sodium succinate IVPB 1000 milliGRAM(s) IV Intermittent daily  pantoprazole    Tablet 40 milliGRAM(s) Oral before breakfast    PRN Meds:  acetaminophen   Tablet .. 650 milliGRAM(s) Oral every 6 hours PRN Mild Pain (1 - 3)  aluminum hydroxide/magnesium hydroxide/simethicone Suspension 30 milliLiter(s) Oral every 4 hours PRN Dyspepsia    HOME MEDICATIONS:  aspirin 81 mg oral tablet: 1 tab(s) orally once a day  Vitamin D3 1000 intl units oral tablet: 1 tab(s) orally once a day  vitamin E 100 intl units oral capsule: 1 cap(s) orally once a day      Vital Signs:   T(F): 96.7 (02-26-19 @ 04:32), Max: 97 (02-25-19 @ 21:07)  HR: 62 (02-26-19 @ 04:32) (62 - 78)  BP: 120/62 (02-26-19 @ 04:32) (120/62 - 162/97)  RR: 19 (02-26-19 @ 04:32) (18 - 19)  SpO2: 98% (02-25-19 @ 21:07) (98% - 98%)    Physical Exam:   GENERAL: NAD, Pleaseant and conversive, lying in bed with no acute complaints   HEENT: NCAT, PERRLR, noted slight facial droop on right side   CHEST/LUNG: CTA, No wheezing, rhonchi, rales  HEART: Regular rate and rhythm; s1 s2 appreciated, No murmurs, rubs, or gallops  ABDOMEN: Soft, Nontender, Nondistended; Bowel sounds present  EXTREMITIES: No LE edema b/l, Peripheral pulses 2+, No cyanosis, No clubbing  NERVOUS SYSTEM:  Alert & Oriented X3, diminished sensation on right side of face and bilateral lower extremities. Cranial nerves ii-xii grossly intact. Muscle strength 5./5 in bilateral upper and lower extremities. Right sided facial droop    Labs:     Troponin <0.01, CKMB --, CK -- 02-26-19 @ 02:05  Troponin <0.01, CKMB --, CK -- 02-25-19 @ 21:31  Troponin <0.01, CKMB --, CK -- 02-25-19 @ 18:25  Troponin <0.01, CKMB 1.8, CK 82 02-24-19 @ 07:57  Troponin <0.01, CKMB 2.0, CK 79 02-24-19 @ 00:19  Troponin <0.01, CKMB --, CK -- 02-23-19 @ 14:33    Radiology:   None Today     Assessment and Plan:   This is a 63 year old female with PMHx of MS who presented to the ER with a three day history of right facial droop and one day history of chest pain    #MS Exacerbation  - Finished 3 doses of IV solumedrol as per neuro recs  - Outpatient follow up with Dr. Power in 2-3 weeks   - MRI noted no new enhancing lesions    #Chest Pain r/o ACS  - Negative Adenosine stress test   - Continue with Aspirin 81 and Lipitor 20    Activity: As tolerated  Diet: DASH  DVT ppx: Lovenox  GI ppx: Protonix  Code Status: full code  DISPO: Discharge home today    Case discussed with Dr. Roa      FINAL DISCHARGE INTERVIEW:  Resident(s) Present: Sherwin Aldana RN Present: Laverne PLATA MEDICATION RECONCILIATION  reviewed with Attending Dr. Roa    DISPOSITION:   [ x ] Home,    [  ] Home with Visiting Nursing Services,   [    ]  SNF/ NH,    [   ] Acute Rehab (4A),   [   ] Other (Specify:_________) Hospital Day:  3d    Subjective:    Patient is a 63y old  Female who presents with a chief complaint of R facial drop and chest pain (26 Feb 2019 10:43)    No overnight events. Seen and examined at bedside. Patient reported no acute complaints and wants to go home today. She was seen and examined with attending present and agree that she is medically stable for discharge. Ten point review of systems was otherwise negative.     Telemetry reviewed: NSR 60-70s. No events     Past Medical Hx:   Multiple sclerosis    Past Sx:  History of facial surgery  H/O breast augmentation  H/O: hysterectomy    Allergies:  ampicillin (Other)    Current Meds:   Standng Meds:  aspirin  chewable 81 milliGRAM(s) Oral daily  atorvastatin 20 milliGRAM(s) Oral at bedtime  enoxaparin Injectable 40 milliGRAM(s) SubCutaneous every 24 hours  methylPREDNISolone sodium succinate IVPB 1000 milliGRAM(s) IV Intermittent daily  pantoprazole    Tablet 40 milliGRAM(s) Oral before breakfast    PRN Meds:  acetaminophen   Tablet .. 650 milliGRAM(s) Oral every 6 hours PRN Mild Pain (1 - 3)  aluminum hydroxide/magnesium hydroxide/simethicone Suspension 30 milliLiter(s) Oral every 4 hours PRN Dyspepsia    HOME MEDICATIONS:  aspirin 81 mg oral tablet: 1 tab(s) orally once a day  Vitamin D3 1000 intl units oral tablet: 1 tab(s) orally once a day  vitamin E 100 intl units oral capsule: 1 cap(s) orally once a day      Vital Signs:   T(F): 96.7 (02-26-19 @ 04:32), Max: 97 (02-25-19 @ 21:07)  HR: 62 (02-26-19 @ 04:32) (62 - 78)  BP: 120/62 (02-26-19 @ 04:32) (120/62 - 162/97)  RR: 19 (02-26-19 @ 04:32) (18 - 19)  SpO2: 98% (02-25-19 @ 21:07) (98% - 98%)    Physical Exam:   GENERAL: NAD, Pleaseant and conversive, lying in bed with no acute complaints   HEENT: NCAT, PERRLR, noted slight facial droop on right side   CHEST/LUNG: CTA, No wheezing, rhonchi, rales  HEART: Regular rate and rhythm; s1 s2 appreciated, No murmurs, rubs, or gallops  ABDOMEN: Soft, Nontender, Nondistended; Bowel sounds present  EXTREMITIES: No LE edema b/l, Peripheral pulses 2+, No cyanosis, No clubbing  NERVOUS SYSTEM:  Alert & Oriented X3, diminished sensation on right side of face and bilateral lower extremities. Cranial nerves ii-xii grossly intact. Muscle strength 5./5 in bilateral upper and lower extremities. Right sided facial droop    Labs:     Troponin <0.01, CKMB --, CK -- 02-26-19 @ 02:05  Troponin <0.01, CKMB --, CK -- 02-25-19 @ 21:31  Troponin <0.01, CKMB --, CK -- 02-25-19 @ 18:25  Troponin <0.01, CKMB 1.8, CK 82 02-24-19 @ 07:57  Troponin <0.01, CKMB 2.0, CK 79 02-24-19 @ 00:19  Troponin <0.01, CKMB --, CK -- 02-23-19 @ 14:33    Radiology:   None Today     Assessment and Plan:   This is a 63 year old female with PMHx of MS who presented to the ER with a three day history of right facial droop and one day history of chest pain    #MS Exacerbation  - Finished 3 doses of IV solumedrol as per neuro recs  - Outpatient follow up with Dr. Power in 2-3 weeks   - MRI noted no new enhancing lesions    #Chest Pain r/o ACS  - Negative Adenosine stress test   - Continue with Aspirin 81 and stop Lipitor 20    Activity: As tolerated  Diet: DASH  DVT ppx: Lovenox  GI ppx: Protonix  Code Status: full code  DISPO: Discharge home today    Case discussed with Dr. Roa      FINAL DISCHARGE INTERVIEW:  Resident(s) Present: Sherwin Aldana RN Present: Laverne PLATA MEDICATION RECONCILIATION  reviewed with Attending Dr. Roa    DISPOSITION:   [ x ] Home,    [  ] Home with Visiting Nursing Services,   [    ]  SNF/ NH,    [   ] Acute Rehab (4A),   [   ] Other (Specify:_________)

## 2019-02-26 NOTE — DISCHARGE NOTE ADULT - MEDICATION SUMMARY - MEDICATIONS TO TAKE
I will START or STAY ON the medications listed below when I get home from the hospital:    aspirin 81 mg oral tablet  -- 1 tab(s) by mouth once a day  -- Indication: For Vascular diseae     Vitamin D3 1000 intl units oral tablet  -- 1 tab(s) by mouth once a day  -- Indication: For Vitamins    vitamin E 100 intl units oral capsule  -- 1 cap(s) by mouth once a day  -- Indication: For Vitamins

## 2019-02-26 NOTE — DISCHARGE NOTE ADULT - CARE PLAN
Principal Discharge DX:	Multiple sclerosis  Goal:	To assess and treat  Assessment and plan of treatment:	You were found to have a MS exacerbation. An MRI showed no new enhancing lesions. You received a short course of IV solumedrol and were followed by Neurology. You will need to follow up with Dr. Power; neurologist, in 2-3 weeks for further management and possibility of resuming a long term preventative treatment  Secondary Diagnosis:	Chest pain  Goal:	To monitor and treat  Assessment and plan of treatment:	You had a negative adenosine stress test. There is no further cardiac workup needed.

## 2019-02-26 NOTE — DISCHARGE NOTE ADULT - HOSPITAL COURSE
63Y F with pmh of multiple sclerosis presents to the ED with R sided facial droop for 3 days and chest pain for 1 day. She was found to have an MS exacerbation with no new lesions. An adenosine stress test was negative and the patient will follow up with Neurology in 2 weeks. An appointment was made for Geriatrics on March 14th.

## 2019-02-26 NOTE — DISCHARGE NOTE ADULT - PLAN OF CARE
To assess and treat You were found to have a MS exacerbation. An MRI showed no new enhancing lesions. You received a short course of IV solumedrol and were followed by Neurology. You will need to follow up with Dr. Power; neurologist, in 2-3 weeks for further management and possibility of resuming a long term preventative treatment To monitor and treat You had a negative adenosine stress test. There is no further cardiac workup needed.

## 2019-02-26 NOTE — PROGRESS NOTE ADULT - ASSESSMENT
63Y F with pmh of multiple sclerosis presents to the ED with R sided facial droop for 3 days and chest pain for 1 day.      1.	Likely MS exacerbation  2.	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	CP musculoskeletal         PLAN:    ·	Neuro eval and F/U noted  ·	Pt completed IV Solumedrol course as per Neuro recommendation  ·	Brain and cervical spine MRI noted. No new changes  ·	Normal Nuclear stress test. Cleared by cardiology to D/C home    * Med rec reviewed. Plan of care D/W the pt. [facility] appt given on 3/14. Time spent 41 minutes.

## 2019-02-26 NOTE — DISCHARGE NOTE ADULT - PATIENT PORTAL LINK FT
You can access the [facility] Patient Portal, offered by [facility], by registering with the following website: [email or web address]

## 2019-02-26 NOTE — DISCHARGE NOTE ADULT - ADDITIONAL INSTRUCTIONS
Please follow up with the Geriatrics clinic on March 14th at [facility] [place] [facility] [place] [facility]. Please follow up with Dr. Power in 2-3 weeks.

## 2019-02-26 NOTE — DISCHARGE NOTE ADULT - CARE PROVIDER_API CALL
Kunal Power)  Neurology  [address] [place] [address], [address]  [place] [address]  Phone: [phone]  Fax: [phone]  Follow Up Time:

## 2019-02-26 NOTE — PROGRESS NOTE ADULT - SUBJECTIVE AND OBJECTIVE BOX
INTERVAL HISTORY: No overnight events.  	  MEDICATIONS:  acetaminophen   Tablet .. 650 milliGRAM(s) Oral every 6 hours PRN  aluminum hydroxide/magnesium hydroxide/simethicone Suspension 30 milliLiter(s) Oral every 4 hours PRN  aspirin  chewable 81 milliGRAM(s) Oral daily  atorvastatin 20 milliGRAM(s) Oral at bedtime  enoxaparin Injectable 40 milliGRAM(s) SubCutaneous every 24 hours  pantoprazole    Tablet 40 milliGRAM(s) Oral before breakfast      REVIEW OF SYSTEMS:  CONSTITUTIONAL: No fever, weight loss, or fatigue  NECK: No pain or stiffness  RESPIRATORY: No cough, wheezing, chills or hemoptysis; No shortness of breath  CARDIOVASCULAR: No chest pain, palpitations, dizziness, or leg swelling  GASTROINTESTINAL: No abdominal or epigastric pain. No nausea, vomiting, or hematemesis; No diarrhea or constipation. No melena or hematochezia.  GENITOURINARY: No dysuria, frequency, hematuria, or incontinence  NEUROLOGICAL: No headaches, memory loss, loss of strength, numbness, or tremors  SKIN: No itching, burning, rashes, or lesions   ENDOCRINE: No heat or cold intolerance; No hair loss  MUSCULOSKELETAL: No joint pain or swelling; No muscle, back, or extremity pain  HEME/LYMPH: No easy bruising, or bleeding gums    PHYSICAL EXAM:  T(C): 35.9 (02-26-19 @ 04:32), Max: 36.1 (02-25-19 @ 21:07)  HR: 62 (02-26-19 @ 04:32) (62 - 78)  BP: 120/62 (02-26-19 @ 04:32) (120/62 - 162/97)  RR: 19 (02-26-19 @ 04:32) (18 - 19)  SpO2: 98% (02-25-19 @ 21:07) (98% - 98%)  Wt(kg): --  I&O's Summary    Height (cm): 160.02 (02-25 @ 23:06)  Weight (kg): 64 (02-25 @ 23:06)  BMI (kg/m2): 25 (02-25 @ 23:06)  BSA (m2): 1.67 (02-25 @ 23:06)    GENERAL: NAD, well-groomed, well-developed  HEAD:  Atraumatic, Normocephalic  NECK: Supple, No JVD, Normal thyroid  NERVOUS SYSTEM:  Alert & Oriented X3, Good concentration  CHEST/LUNG: Clear to percussion bilaterally; No rales, rhonchi, wheezing, or rubs  HEART: Regular rate and rhythm; No murmurs, rubs, or gallops  ABDOMEN: Soft, Nontender, Nondistended; Bowel sounds present  EXTREMITIES:  2+ Peripheral Pulses, No clubbing, cyanosis, or edema  SKIN: No rashes or lesions    LABS:	 	    CARDIAC MARKERS ( 26 Feb 2019 02:05 )  x     / <0.01 ng/mL / x     / x     / x      CARDIAC MARKERS ( 25 Feb 2019 21:31 )  x     / <0.01 ng/mL / x     / x     / x      CARDIAC MARKERS ( 25 Feb 2019 18:25 )  x     / <0.01 ng/mL / x     / x     / x                  proBNP:   Lipid Profile:

## 2019-02-27 PROBLEM — G35 MULTIPLE SCLEROSIS: Chronic | Status: ACTIVE | Noted: 2019-02-23

## 2019-03-01 DIAGNOSIS — Z82.3 FAMILY HISTORY OF STROKE: ICD-10-CM

## 2019-03-01 DIAGNOSIS — Z88.0 ALLERGY STATUS TO PENICILLIN: ICD-10-CM

## 2019-03-01 DIAGNOSIS — I44.7 LEFT BUNDLE-BRANCH BLOCK, UNSPECIFIED: ICD-10-CM

## 2019-03-01 DIAGNOSIS — Z82.49 FAMILY HISTORY OF ISCHEMIC HEART DISEASE AND OTHER DISEASES OF THE CIRCULATORY SYSTEM: ICD-10-CM

## 2019-03-01 DIAGNOSIS — F17.200 NICOTINE DEPENDENCE, UNSPECIFIED, UNCOMPLICATED: ICD-10-CM

## 2019-03-01 DIAGNOSIS — Z90.710 ACQUIRED ABSENCE OF BOTH CERVIX AND UTERUS: ICD-10-CM

## 2019-03-01 DIAGNOSIS — R10.13 EPIGASTRIC PAIN: ICD-10-CM

## 2019-03-01 DIAGNOSIS — G35 MULTIPLE SCLEROSIS: ICD-10-CM

## 2019-03-01 DIAGNOSIS — Z79.82 LONG TERM (CURRENT) USE OF ASPIRIN: ICD-10-CM

## 2019-03-01 DIAGNOSIS — K21.9 GASTRO-ESOPHAGEAL REFLUX DISEASE WITHOUT ESOPHAGITIS: ICD-10-CM

## 2019-03-01 DIAGNOSIS — R07.89 OTHER CHEST PAIN: ICD-10-CM

## 2019-03-01 DIAGNOSIS — Z28.21 IMMUNIZATION NOT CARRIED OUT BECAUSE OF PATIENT REFUSAL: ICD-10-CM

## 2019-03-01 DIAGNOSIS — Z91.19 PATIENT'S NONCOMPLIANCE WITH OTHER MEDICAL TREATMENT AND REGIMEN: ICD-10-CM

## 2019-03-01 DIAGNOSIS — E78.5 HYPERLIPIDEMIA, UNSPECIFIED: ICD-10-CM

## 2019-03-14 ENCOUNTER — APPOINTMENT (OUTPATIENT)
Dept: GERIATRICS | Facility: CLINIC | Age: 64
End: 2019-03-14

## 2019-04-04 ENCOUNTER — OUTPATIENT (OUTPATIENT)
Dept: OUTPATIENT SERVICES | Facility: HOSPITAL | Age: 64
LOS: 1 days | Discharge: HOME | End: 2019-04-04

## 2019-04-04 ENCOUNTER — APPOINTMENT (OUTPATIENT)
Dept: GERIATRICS | Facility: CLINIC | Age: 64
End: 2019-04-04

## 2019-04-04 VITALS
DIASTOLIC BLOOD PRESSURE: 84 MMHG | HEIGHT: 64 IN | WEIGHT: 142 LBS | SYSTOLIC BLOOD PRESSURE: 131 MMHG | BODY MASS INDEX: 24.24 KG/M2 | HEART RATE: 86 BPM | TEMPERATURE: 97.6 F

## 2019-04-04 DIAGNOSIS — Z00.00 ENCOUNTER FOR GENERAL ADULT MEDICAL EXAMINATION W/OUT ABNORMAL FINDINGS: ICD-10-CM

## 2019-04-04 DIAGNOSIS — Z78.9 OTHER SPECIFIED HEALTH STATUS: ICD-10-CM

## 2019-04-04 DIAGNOSIS — Z82.49 FAMILY HISTORY OF ISCHEMIC HEART DISEASE AND OTHER DISEASES OF THE CIRCULATORY SYSTEM: ICD-10-CM

## 2019-04-04 DIAGNOSIS — Z98.890 OTHER SPECIFIED POSTPROCEDURAL STATES: Chronic | ICD-10-CM

## 2019-04-04 DIAGNOSIS — Z81.8 FAMILY HISTORY OF OTHER MENTAL AND BEHAVIORAL DISORDERS: ICD-10-CM

## 2019-04-04 DIAGNOSIS — Z98.82 BREAST IMPLANT STATUS: Chronic | ICD-10-CM

## 2019-04-04 DIAGNOSIS — Z98.82 BREAST IMPLANT STATUS: ICD-10-CM

## 2019-04-04 DIAGNOSIS — Z90.710 ACQUIRED ABSENCE OF BOTH CERVIX AND UTERUS: Chronic | ICD-10-CM

## 2019-04-04 NOTE — HISTORY OF PRESENT ILLNESS
[0] : 1) Little interest or pleasure doing things: Not at all [1] : 2) Feeling down, depressed, or hopeless for several days [FreeTextEntry1] : 63 y o f with PMH of multiple sclerosis not on any medications presented today for initial evaluation and to establish care. Pt has no complaints today. Pt reports that she was admitted  to hospital in March with flare when she had facial droop and chest pain. She  was given iv solumedrol for 3 days. Pt also had MRI that showed no new lesions.Pt also had stress test that was unremarkable.  Pt was seen by Dr Celestin while in the hospital and would like to follow up with him. She was following up with neurologist at [place] and was started on [place] which she was unable to tolerate sec to adverse effects.\par  Pt also reports that for past 6 months she has been having panic attack frequently. Pt reports that even driving for short distances sometimes make anxious and she starts having palpitations . Pt is also reporting insomnia , but reports that her appetite is good and she is enjoys her activities.\par Pt reports that she had breast implants in her 30s , gets MRI for screening . She had MRI > 4 years ago and it was normal. Pt now reports right sided breast pain aching , but denies any swelling or reddness.\par

## 2019-04-04 NOTE — REVIEW OF SYSTEMS
[Breast Pain] : breast pain [Negative] : Genitourinary [Skin Lesions] : no skin lesions [Skin Wound] : no skin wound [FreeTextEntry5] : c/o palpitations and sob on ambulation. [FreeTextEntry9] : tingling and numbness  [de-identified] : C/o right breast pain

## 2019-04-04 NOTE — ASSESSMENT
[FreeTextEntry1] : 63 y o f with PMH of multiple sclerosis not on any medications presented today for initial evaluation and to establish care. \par \par # MULTIPLE SCLEROSIS / S/P RECENT ADMISSION WITH FLARE:\par \par - MRI done in hospital was unremarkable.\par - pt reports improvement in her symptoms.\par - will f/u with neurology. \par \par # BREAST TENDERNESS:\par \par - will refer for MRI now .\par \par # ANXIET/ PHQ 9 SCORE 8 :\par \par - will start pt on paroxetine 20 mg once a day at bedtime.\par \par # HCM:\par \par - colonoscopy was done 2 years ago , as per pt it was normal. Will recommend pt to bring the records.\par - Pt educated about smoking cessation.\par -will refer for low dose  ct chest for screening.\par - LABS done in hospital reviewed , no need to repeat the labs.\par - RTC in 3 months. \par \par GERIATRICS ATTENDING SEEN WITH [facility]\par As above; patient is s/p hospitalization for exacerbation of Multiple sclerosis - we are establishing fl/u for her with Dr. Cee of neurology.  Also she has a positive PHQ9 and admits to both anxiety and post traumatic stress; counseled her at length; these are long standing issues - will start her on paroxetine 20mg q HS - counseled patient on use of medications and expectations.  She is also getting referrals for MRI breast (has implants and not UTD with f/u and has ongoing diffuse right breast pain - last surgical procedure on right breast was with Dr. Mayberry); also she is a smoker - counseled on risks; will or screening CT chest.\par RTC 2-3 months.

## 2019-04-04 NOTE — PHYSICAL EXAM
[General Appearance - Alert] : alert [General Appearance - Well Nourished] : well nourished [General Appearance - Well-Appearing] : healthy appearing [Sclera] : the sclera and conjunctiva were normal [Normal Oral Mucosa] : normal oral mucosa [No Oral Pallor] : no oral pallor [Neck Appearance] : the appearance of the neck was normal [Breast Appearance] : normal in appearance [Breast Palpation Mass] : no palpable masses [Bowel Sounds] : normal bowel sounds [Abdomen Tenderness] : non-tender [No CVA Tenderness] : no ~M costovertebral angle tenderness [Abnormal Walk] : normal gait [Musculoskeletal - Swelling] : no joint swelling seen [Skin Color & Pigmentation] : normal skin color and pigmentation [Skin Turgor] : normal skin turgor [] : no rash [Cranial Nerves] : cranial nerves 2-12 were intact [No Focal Deficits] : no focal deficits [FreeTextEntry1] : No palpable masses

## 2019-05-24 ENCOUNTER — TRANSCRIPTION ENCOUNTER (OUTPATIENT)
Age: 64
End: 2019-05-24

## 2019-07-02 ENCOUNTER — APPOINTMENT (OUTPATIENT)
Dept: NEUROLOGY | Facility: CLINIC | Age: 64
End: 2019-07-02
Payer: MEDICARE

## 2019-07-02 VITALS
DIASTOLIC BLOOD PRESSURE: 93 MMHG | BODY MASS INDEX: 23.73 KG/M2 | SYSTOLIC BLOOD PRESSURE: 148 MMHG | HEIGHT: 64 IN | HEART RATE: 73 BPM | WEIGHT: 139 LBS

## 2019-07-02 DIAGNOSIS — G51.31 CLONIC HEMIFACIAL SPASM, RIGHT: ICD-10-CM

## 2019-07-02 PROCEDURE — 99203 OFFICE O/P NEW LOW 30 MIN: CPT

## 2019-09-06 ENCOUNTER — APPOINTMENT (OUTPATIENT)
Dept: NEUROLOGY | Facility: CLINIC | Age: 64
End: 2019-09-06
Payer: MEDICARE

## 2019-09-06 PROBLEM — G51.31 CLONIC HEMIFACIAL SPASM OF MUSCLE OF RIGHT SIDE OF FACE: Status: ACTIVE | Noted: 2019-07-02

## 2019-09-06 PROCEDURE — 64612 DESTROY NERVE FACE MUSCLE: CPT

## 2019-09-06 RX ORDER — ONABOTULINUMTOXINA 100 [USP'U]/1
100 INJECTION, POWDER, LYOPHILIZED, FOR SOLUTION INTRADERMAL; INTRAMUSCULAR
Qty: 1 | Refills: 0 | Status: COMPLETED | OUTPATIENT
Start: 2019-09-06

## 2019-09-06 RX ORDER — PAROXETINE HYDROCHLORIDE 20 MG/1
20 TABLET, FILM COATED ORAL DAILY
Qty: 30 | Refills: 0 | Status: DISCONTINUED | COMMUNITY
Start: 2019-04-04 | End: 2019-09-06

## 2019-09-06 RX ADMIN — ONABOTULINUMTOXINA 0.09 UNIT: 100 INJECTION, POWDER, LYOPHILIZED, FOR SOLUTION INTRADERMAL; INTRAMUSCULAR at 00:00

## 2019-09-06 NOTE — PROCEDURE
[FreeTextEntry1] : Botox [FreeTextEntry2] : Clonic hemifacial spasm [FreeTextEntry3] : Procedure explained.  Risk of bleeding infection, pain, swallow difficulty, facial droop, eyelid droop, neck weakness, generalized weakness explained.  Consent obtained.  Next a 100 unit vial of Onabotulinum toxin type A, Lot # [id number], exp. date 03/2022 was mixed with 4 cc's of normal saline for a dilution of 2.5 units per 0.1 ml.  Next injections were performed as follows using a 30 gauge 1/2" needle:\par \par Right orbicularis oculis inferomedial: 1.25 units.  Right orbicularis oculis inferomedial: 1.25 units, right zygomaticus 1.25 units, right nasalis 1.25 units, right superior lateral orbicularis oris 2.5 units distributed over 2 locations, right inferolateral orbicularis oris 1.25 units.\par \par Total botox injected = 8.75 units\par Total botox discarded = 91.25 units\par \par Complications:  none\par  [FreeTextEntry4] : none

## 2019-09-15 ENCOUNTER — EMERGENCY (EMERGENCY)
Facility: HOSPITAL | Age: 64
LOS: 0 days | Discharge: HOME | End: 2019-09-15
Attending: EMERGENCY MEDICINE | Admitting: EMERGENCY MEDICINE
Payer: MEDICARE

## 2019-09-15 VITALS
SYSTOLIC BLOOD PRESSURE: 141 MMHG | DIASTOLIC BLOOD PRESSURE: 83 MMHG | RESPIRATION RATE: 18 BRPM | TEMPERATURE: 98 F | OXYGEN SATURATION: 97 % | HEART RATE: 98 BPM

## 2019-09-15 VITALS — WEIGHT: 120.81 LBS | HEIGHT: 65 IN

## 2019-09-15 DIAGNOSIS — Z90.710 ACQUIRED ABSENCE OF BOTH CERVIX AND UTERUS: Chronic | ICD-10-CM

## 2019-09-15 DIAGNOSIS — S09.93XA UNSPECIFIED INJURY OF FACE, INITIAL ENCOUNTER: ICD-10-CM

## 2019-09-15 DIAGNOSIS — S01.511A LACERATION WITHOUT FOREIGN BODY OF LIP, INITIAL ENCOUNTER: ICD-10-CM
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
## 2019-09-15 DIAGNOSIS — Z23 ENCOUNTER FOR IMMUNIZATION: ICD-10-CM
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
## 2019-09-15 DIAGNOSIS — Z88.8 ALLERGY STATUS TO OTHER DRUGS, MEDICAMENTS AND BIOLOGICAL SUBSTANCES STATUS: ICD-10-CM
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
## 2019-09-15 DIAGNOSIS — Z98.82 BREAST IMPLANT STATUS: Chronic | ICD-10-CM

## 2019-09-15 DIAGNOSIS — Z98.890 OTHER SPECIFIED POSTPROCEDURAL STATES: Chronic | ICD-10-CM

## 2019-09-15 PROCEDURE — 99283 EMERGENCY DEPT VISIT LOW MDM: CPT

## 2019-09-15 RX ORDER — AMOXICILLIN 250 MG/5ML
1 SUSPENSION, RECONSTITUTED, ORAL (ML) ORAL
Qty: 21 | Refills: 0
Start: 2019-09-15 | End: 2019-09-21

## 2019-09-15 RX ORDER — ASPIRIN/CALCIUM CARB/MAGNESIUM 324 MG
1 TABLET ORAL
Qty: 0 | Refills: 0 | DISCHARGE

## 2019-09-15 RX ORDER — CHOLECALCIFEROL (VITAMIN D3) 125 MCG
1 CAPSULE ORAL
Qty: 0 | Refills: 0 | DISCHARGE

## 2019-09-15 RX ORDER — TETANUS TOXOID, REDUCED DIPHTHERIA TOXOID AND ACELLULAR PERTUSSIS VACCINE, ADSORBED 5; 2.5; 8; 8; 2.5 [IU]/.5ML; [IU]/.5ML; UG/.5ML; UG/.5ML; UG/.5ML
0.5 SUSPENSION INTRAMUSCULAR ONCE
Refills: 0 | Status: COMPLETED | OUTPATIENT
Start: 2019-09-15 | End: 2019-09-15

## 2019-09-15 RX ORDER — ACETAMINOPHEN 500 MG
975 TABLET ORAL ONCE
Refills: 0 | Status: COMPLETED | OUTPATIENT
Start: 2019-09-15 | End: 2019-09-15

## 2019-09-15 RX ORDER — VITAMIN E 100 UNIT
1 CAPSULE ORAL
Qty: 0 | Refills: 0 | DISCHARGE

## 2019-09-15 RX ADMIN — Medication 975 MILLIGRAM(S): at 11:54

## 2019-09-15 RX ADMIN — TETANUS TOXOID, REDUCED DIPHTHERIA TOXOID AND ACELLULAR PERTUSSIS VACCINE, ADSORBED 0.5 MILLILITER(S): 5; 2.5; 8; 8; 2.5 SUSPENSION INTRAMUSCULAR at 11:51

## 2019-09-15 NOTE — ED PROVIDER NOTE - PHYSICAL EXAMINATION
VITAL SIGNS: I have reviewed nursing notes and confirm.  CONSTITUTIONAL: Well-developed; well-nourished; in no acute distress.  SKIN: Skin exam is warm and dry, no acute rash.  HEAD: Normocephalic; atraumatic.  EYES: PERRL, EOM intact; conjunctiva and sclera clear.  ENT: No facial TTP. Multiple upper front tooth FX and missing tooth. Small laceration to inner upper lip, no active bleeding. No nasal discharge; airway clear. TMs clear.  NECK: Supple; non tender.  CARD: S1, S2 normal; no murmurs, gallops, or rubs. Regular rate and rhythm.  RESP: No wheezes, rales or rhonchi.  ABD: Normal bowel sounds; soft; non-distended; non-tender; no hepatosplenomegaly.  EXT: Normal ROM. No clubbing, cyanosis or edema.  MSK: No midline TTP.   LYMPH: No acute cervical adenopathy.  NEURO: CN intact except R facial droop which is chronic per pt’s bell’s palsy. No C- spine TTP. Alert, oriented. Grossly unremarkable. No focal deficits.  PSYCH: Cooperative, appropriate.

## 2019-09-15 NOTE — CONSULT NOTE ADULT - SUBJECTIVE AND OBJECTIVE BOX
Patient is a 64y old Female who presents with a chief complaint of lip laceration and broken anterior fixed partial denture     HPI: Patient reports falling yesterday evening and hitting the front of her face. No reported loss of consciousness       PAST MEDICAL & SURGICAL HISTORY:  Multiple sclerosis  History of facial surgery/[place] Palsy (Right Side)  H/O breast augmentation  H/O: hysterectomy    ( -  ) heart valve replacement  ( -  ) joint replacement  ( -  ) pregnancy    MEDICATIONS  (STANDING): None reported  MEDICATIONS  (PRN):      Allergies: ampicillin (Other)  Intolerances      FAMILY HISTORY:  Family history of stroke (Mother)    *SOCIAL HISTORY: ( -  ) Tobacco; ( -  ) ETOH  - Patient presents with her   *Last Dental Visit: saw outside dentist recently for recare examination.     Vital Signs Last 24 Hrs  T(C): 36.6 (15 Sep 2019 09:52), Max: 36.6 (15 Sep 2019 09:52)  T(F): 97.8 (15 Sep 2019 09:52), Max: 97.8 (15 Sep 2019 09:52)  HR: 98 (15 Sep 2019 09:52) (98 - 98)  BP: 141/83 (15 Sep 2019 09:52) (141/83 - 141/83)  BP(mean): --  RR: 18 (15 Sep 2019 09:52) (18 - 18)  SpO2: 97% (15 Sep 2019 09:52) (97% - 97%)    LABS    EOE:  Lack of muscle tone on right side, from reported history of [place] Palsy. 15mm laceration of the upper lip (right side). Laceration is isolated to the lip and does not extend beyond the vermillion border.     IOE:  Permanent dentition is grossly intact. Anterior implants with torqued ceramic abutments (x4) intact and non-mobile. No pain on palpation. No other signs of intraoral swelling, erythema or lacerations    *DENTAL RADIOGRAPHS: None taken at this time    RADIOLOGY & ADDITIONAL STUDIES:

## 2019-09-15 NOTE — ED PROVIDER NOTE - ATTENDING CONTRIBUTION TO CARE
Pt reports tripping and falling hitting her mouth. Pt reports broken tooth. On exam laceration to the upper lip, lose tooth. Pt is being treated by the dental resident. Hx of MS and bells palsy with left facial weakness. No new neuro issues. No injury noted above the upper lip.

## 2019-09-15 NOTE — ED ADULT NURSE NOTE - NSIMPLEMENTINTERV_GEN_ALL_ED
Implemented All Fall Risk Interventions:  [place] to call system. Call bell, personal items and telephone within reach. Instruct patient to call for assistance. Room bathroom lighting operational. Non-slip footwear when patient is off stretcher. Physically safe environment: no spills, clutter or unnecessary equipment. Stretcher in lowest position, wheels locked, appropriate side rails in place. Provide visual cue, wrist band, yellow gown, etc. Monitor gait and stability. Monitor for mental status changes and reorient to person, place, and time. Review medications for side effects contributing to fall risk. Reinforce activity limits and safety measures with patient and family.

## 2019-09-15 NOTE — ED PROVIDER NOTE - CARE PLAN
Principal Discharge DX:	Fall  Secondary Diagnosis:	Lip laceration  Secondary Diagnosis:	Tooth injury

## 2019-09-15 NOTE — ED PROVIDER NOTE - NSFOLLOWUPINSTRUCTIONS_ED_ALL_ED_FT
Tooth Injuries  Tooth injuries (tooth trauma) include cracked or broken teeth (fractures), teeth that have been moved out of place or dislodged (luxations), and knocked-out teeth (avulsions).    A tooth injury often needs to be treated quickly to save the tooth. However, sometimes it is not possible to save a tooth after an injury, and the tooth may need to be removed (extracted).    What are the causes?  Tooth injuries may be caused by any force that is strong enough to chip, break, dislodge, or knock out a tooth. Forces may come from:  Sports injuries.  Falls.  Accidents.  Fights.  What increases the risk?  You are more likely to injure a tooth if you play contact sports, such as football or boxing, without using a mouth guard. You are also more likely to injure a tooth if you participate in high-risk activities.    What are the signs or symptoms?  Symptoms of this condition include a tooth that is forced into the gum. This tooth may appear dislodged or moved out of position and into the tooth socket. A fractured tooth may not be so visible. Other symptoms of a tooth injury include:  Pain, especially when chewing.  A loose tooth.  Bleeding in or around the tooth.  Swelling or bruising near the tooth.  Swelling or bruising of the lip over the injured tooth.  Increased tooth sensitivity to heat and cold.  A tooth that is knocked out of its place in the gum (socket).  How is this diagnosed?  A tooth injury can be diagnosed with a medical history and a physical exam. You may also need dental X-rays to check for injuries to the root of the tooth.    How is this treated?  Treatment depends on the type of injury that you have and how bad it is. Treatment may need to be done quickly to save your tooth. Possible treatments include:  Replacing a tooth fragment with a filling, a cap, or a hard, protective cover (crown). This may be an option for a chip or fracture that does not affect the inside of your tooth (pulp).  Repairing the inside of the tooth (root canal) and then placing a crown on top. This may be done to treat a tooth fracture that affects the pulp.  Repositioning a dislodged tooth, then doing a root canal. The root canal usually needs to be done within a few days of the injury. After the procedure, you may need to have a brace or splint to hold the tooth in place.  Replacing a knocked-out tooth in the socket, if possible, and then doing a root canal a few weeks later.  Extracting a tooth for a fracture that extends below the gum line or that splits the tooth completely.  Taking medicine, including:  Pain medicine.  Antibiotic medicine to help prevent infection.  Follow these instructions at home:  Image   Medicines     Take over-the-counter and prescription medicines only as told by your health care provider.  If you were prescribed an antibiotic medicine, take it as told by your health care provider. Do not stop taking the antibiotic even if you start to feel better.  Do not drive or use heavy machinery while taking prescription pain medicine.  General instructions     Do not eat or chew on very hard objects. These include ice cubes, pens, pencils, hard candy, and popcorn kernels.  Do not use any products that contain nicotine or tobacco, such as cigarettes and e-cigarettes. These may delay healing. If you need help quitting, ask your health care provider.  Do not clench or grind your teeth. Tell your health care provider if you grind your teeth while you sleep.  Your health care provider may recommend that you eat certain foods. This may include eating only soft foods.  Check the injured area every day for signs of infection. Watch for:  Redness, swelling, or pain.  Fluid, blood, or pus.  If directed, apply ice to your mouth near the injured tooth:  Put ice in a plastic bag.   Place a towel between your skin and the bag.   Leave the ice on for 20 minutes, 2–3 times a day.  Gargle with a salt-water mixture 3–4 times a day or as needed. To make a salt-water mixture, completely dissolve ½–1 tsp of salt in 1 cup of warm water.  Brush your teeth gently as directed by your health care provider.  Do not use your teeth to open packages.  Always wear mouth protection when you play contact sports.  Keep all follow-up visits as told by your health care provider. This is important.  Contact a health care provider if:  Your pain gets much worse, even after you take pain medicine.  You have pus coming from the site of the tooth injury.  You develop swelling near your injured tooth.  Your tooth splint—if you have one—becomes loose.  Your tooth becomes loose.  Get help right away if:  You develop facial swelling.  You have a fever.  You have bleeding near the tooth that does not stop in 10 minutes.  You have trouble swallowing.  You have trouble opening your mouth.  Your permanent tooth comes out after it is repositioned.  Summary  Tooth injuries include cracked or broken teeth and teeth that have been dislodged or moved out.  A tooth injury can be diagnosed with a medical history and a physical exam. You may also need dental X-rays to check for injuries to the root of the tooth.  Treatment depends on the type of injury you have and how bad it is. Treatment may need to be done quickly to save your tooth.  This information is not intended to replace advice given to you by your health care provider. Make sure you discuss any questions you have with your health care provider    Laceration    A laceration is a cut that goes through all of the layers of the skin and into the tissue that is right under the skin. Some lacerations heal on their own. Others need to be closed with stitches (sutures), staples, skin adhesive strips, or skin glue. Proper laceration care minimizes the risk of infection and helps the laceration to heal better.     SEEK IMMEDIATE MEDICAL CARE IF YOU HAVE THE FOLLOWING SYMPTOMS: swelling around the wound, worsening pain, drainage from the wound, red streaking going away from your wound, inability to move finger or toe near the laceration, or discoloration of skin near the laceration.

## 2019-09-15 NOTE — ED PROVIDER NOTE - NS ED ROS FT
Constitutional: See HPI. No fever/chills.  Eyes: No visual changes, eye pain or discharge.  ENT: (+) Tooth FX. No hearing changes, pain, discharge or infections.  Neck: No neck pain or stiffness.  Cardiac: No chest pain, SOB or edema. No chest pain with exertion.  Respiratory: No cough or respiratory distress. No hemoptysis.   GI: No nausea, vomiting, diarrhea or abdominal pain.  : No dysuria, frequency or burning.   MS: No myalgia, muscle weakness, joint pain or back pain.  Neuro: No headache or weakness. No LOC.  Skin: (+) Laceration to upper lip. No rash.  Endocrine: No history of thyroid disease or diabetes.  Except as documented in the HPI, all other systems are negative.

## 2019-09-15 NOTE — ED PROVIDER NOTE - PROGRESS NOTE DETAILS
I personally supervised and was present during the critical aspect of this procedure Pt seen by dental had laceration repaired and advised follow up with her dentist tomorrow. Recommending discharging home with amoxicillin.

## 2019-09-15 NOTE — CONSULT NOTE ADULT - ASSESSMENT
*ASSESSMENT: 63yo Female patient with history of MS, Cerebal Palsy, presents with laceration of her upper lip and damaged dental prosthesis. Upper lip requires suturing and implant retained fixed partial denture requires repair by outside dentist    *PLAN: Suture laceration and have patient follow up with her regular dentist for dental work    PROCEDURE:   Risks of suturing explained. Verbal consent given.  Anesthesia: <<1 carpule 3% Mepivacaine without epinephrine>> via infiltration   Treatment: <<Placed two interrupted sutures, in upper lip using 3.0 gut sutures>>    RECOMMENDATIONS:  1) Antibiotic therapy/topical Bacitracin   2) Dental F/U with outpatient dentist for comprehensive dental care.   3) If any difficulty swallowing/breathing, fever occur, return to ER.     Geovanny Rosado

## 2019-09-15 NOTE — ED ADULT TRIAGE NOTE - CHIEF COMPLAINT QUOTE
Pt slipped and fell yesterday, hit face on tile floor, no use of blood thinners, no LOC, c/o dental pain and R facial pain.

## 2019-09-15 NOTE — ED PROVIDER NOTE - OBJECTIVE STATEMENT
63 y/o F with PMH of MS and bell’s palsy now presents to ED after a mechanical fall yesterday at 5PM. Pt states she slipped over water and fell frontward hitting her face and sustaining multiple tooth FXs causing bridge to fall and a small upper lip laceration. NO LOC. No AC. SX are moderate, no alleviating or aggravating factors. No CP, SOB, HA, neck pain, visual changes, paresthesias, abdominal or back pain N/V, or joint pain.

## 2019-09-15 NOTE — ED PROVIDER NOTE - PATIENT PORTAL LINK FT
You can access the FollowMyHealth Patient Portal offered by [facility] by registering at the following website: [email or web address]. By joining [facility]’s FollowMyHealth portal, you will also be able to view your health information using other applications (apps) compatible with our system.

## 2019-10-11 NOTE — HISTORY OF PRESENT ILLNESS
[FreeTextEntry1] : 64 year old female with PMH of multiple sclerosis not on any medications presents for new patient visit.(Tried Tecfidera but was unable to tolerate sec adverse effects.)  She currently p/w a chronic constant facial twitching/spasm of her right eye and right cheek exacerbated by anxiety and stress.   She reports having right facial weakness and twitching in 2013 leading to brain MRI leading to diagnosis of multiple sclerosis.  Patient had Botox injection in the past which worked for the facial twitching but she could not afford to continue the injections.  She would like to try Botox again.  She still has a facial droop but patient and  report it is better, less noticeable.  She also has right lower back pain radiating to her right leg and foot. Recently having urinary urgency and feeling she is not completely emptying her bladder.  She also complains of chronic fatigue and "needs to push herself to get out of bed in the morning".  The patient has anxiety which it is interfering with her daily activities.  \par \par Patient had an MRI in March 2019 which showed no new lesions.\par

## 2019-10-11 NOTE — ASSESSMENT
[FreeTextEntry1] : Patient p/w right hemifacial spasm/fasciculations involving her eye and cheek.  Patient will return for a Botox injection and reports improvement of fasciculations with Botox injection in the past.  Right facial droop is less noticeable, resolving.  Urinary urgency most likely related to MS.  Sciatica like pain is intermittent and resolving.  Patient had negative straight leg raise, strength 5/5, intact to p/p light tough, balance and coordination normal, gait normal.  MRI from March showed no new lesions.                  \par \par 1. Prescribed Escitalopram 5 mg dailly for Anxiety.  Patient will call if she is unable to tolerate Escitalopram.   \par 2. Patient will return for Botox injection for facial spasm. \par 3. Will prescribe Ditropan 5 mg BID for urinary urgency in a couple of weeks/ one month.\par 4. Various MS treatments were discussed including tysabri, avonex (thinks she has tried before), beta seron.  She did not wish to begin a new MS treatment at this time.\par .

## 2019-10-11 NOTE — REVIEW OF SYSTEMS
[Feeling Tired] : feeling tired [Facial Weakness] : facial weakness [FreeTextEntry8] : urinary urgency

## 2019-12-13 ENCOUNTER — APPOINTMENT (OUTPATIENT)
Dept: NEUROLOGY | Facility: CLINIC | Age: 64
End: 2019-12-13
Payer: MEDICARE

## 2019-12-13 PROCEDURE — 64612 DESTROY NERVE FACE MUSCLE: CPT

## 2019-12-13 RX ORDER — ONABOTULINUMTOXINA 100 [USP'U]/1
100 INJECTION, POWDER, LYOPHILIZED, FOR SOLUTION INTRADERMAL; INTRAMUSCULAR
Qty: 1 | Refills: 0 | Status: COMPLETED | OUTPATIENT
Start: 2019-12-13

## 2019-12-13 RX ADMIN — ONABOTULINUMTOXINA 0 UNIT: 100 INJECTION, POWDER, LYOPHILIZED, FOR SOLUTION INTRADERMAL; INTRAMUSCULAR at 00:00

## 2019-12-13 NOTE — PROCEDURE
[FreeTextEntry2] : Hemifacial spasm [FreeTextEntry4] : none [FreeTextEntry3] : Procedure Note: Procedure explained. Risk of bleeding infection, pain, swallow difficulty, facial droop, eyelid droop, neck weakness, generalized weakness explained. Consent obtained. Next a 100 unit vial of Onabotulinum toxin type A, Lot # [id number], exp. date 05/2022 was mixed with 4 cc's of normal saline for a dilution of 2.5 units per 0.1 ml. Next injections were performed as follows using a 30 gauge 1/2" needle:\par \par Right orbicularis oculis inferomedial: 2.5 units dived over two locations. Right orbicularis oculis inferomedial: 2.5 units diveded over two locations.  Patient experienced increased right facial droop after previous Botox injections so declined zygomaticus nasalis, and orbicularis oris injections today. \par \par Total botox injected = 5 units\par Total botox discarded = 95 units\par \par Complications: none

## 2020-03-03 ENCOUNTER — APPOINTMENT (OUTPATIENT)
Dept: NEUROLOGY | Facility: CLINIC | Age: 65
End: 2020-03-03
Payer: MEDICARE

## 2020-03-03 VITALS
HEIGHT: 64 IN | WEIGHT: 139 LBS | BODY MASS INDEX: 23.73 KG/M2 | HEART RATE: 75 BPM | SYSTOLIC BLOOD PRESSURE: 124 MMHG | DIASTOLIC BLOOD PRESSURE: 84 MMHG | TEMPERATURE: 97.8 F | OXYGEN SATURATION: 97 %

## 2020-03-03 PROCEDURE — 99213 OFFICE O/P EST LOW 20 MIN: CPT

## 2020-03-03 NOTE — HISTORY OF PRESENT ILLNESS
[FreeTextEntry1] : Pt seen today in f/u for MS.  States she has increasing fatigue, getting worse.  Had been on tecfidera but "tore up my stomach".    Also c/o some paresthesias in her feet, can bother her at night.\par Has shower seat.  Denies falls.  Doesn't use walking aid.\par \par When seen in December for Botox for blepharospasm received 5 units total and effect lasted 2 months only she states.  Previously when received 8.x units had increase in right facial droop.\par Will be coming in next week for next Botox injection.\par \par She takes vitamin D supplements but doesn't think level has been checked in a while.\par \par

## 2020-03-03 NOTE — PHYSICAL EXAM
[FreeTextEntry1] : Right blepharospasm.  Right eye does not completely close but orbicularis oculi mm seen to be spasming frequently.  EOM's full.  Right lower facial droop, chronic.  Facial sensation to LT and PP intact.\par \par Motor exam 5/5 upper and lower extremities proximally and distally.  Reflexes 2+ throught.\par Sensory - able to feel LT, PP normally.\par Finger to nose and heel to shin intact.\par \par Tandem gait unsteady.

## 2020-03-03 NOTE — ASSESSMENT
[FreeTextEntry1] : 1.  Multiple sclerosis, slow progression, worse fatigue.  Patient prefers symptom management rather than disease modifying therapy at this point.  She is open to taking amantadine.  I went over side effects and alternatives (provigil).  She wanted treatment with amantadine.  Will check Vit D level.\par 2.  Right blepharospasm.  Will receive botox next week.\par 3   Return in ~3 months.

## 2020-03-11 ENCOUNTER — APPOINTMENT (OUTPATIENT)
Dept: NEUROLOGY | Facility: CLINIC | Age: 65
End: 2020-03-11
Payer: MEDICARE

## 2020-03-11 PROCEDURE — 64612 DESTROY NERVE FACE MUSCLE: CPT

## 2020-03-11 RX ADMIN — ONABOTULINUMTOXINA 0 UNIT: 100 INJECTION, POWDER, LYOPHILIZED, FOR SOLUTION INTRADERMAL; INTRAMUSCULAR at 00:00

## 2020-03-11 NOTE — PROCEDURE
[FreeTextEntry1] : Botox injection [FreeTextEntry2] : Blepharospasm [FreeTextEntry4] : none [FreeTextEntry3] : Indications: Hemifacial spasm. \par Anesthesia: none. \par Procedure Note: Procedure Note: Procedure explained. Risk of bleeding infection, pain, swallow difficulty, facial droop, eyelid droop, neck weakness, generalized weakness explained. Consent obtained. Next a 100 unit vial of Onabotulinum toxin type A, Lot # [id number], exp. date 06/2022 was mixed with 4 cc's of normal saline for a dilution of 2.5 units per 0.1 ml. Next injections were performed as follows using a 30 gauge 1/2" needle:\par \par Right orbicularis oculis inferomedial: 3.75 units divided over three locations. Right orbicularis oculis inferomedial: 3.75 units divided over three locations. Patient experienced increased right facial droop after a previous Botox injections so declined zygomaticus nasalis, and orbicularis oris injections today. \par \par Total botox injected = 7.5 units\par Total botox discarded = 92.5 units\par \par Complications: none.

## 2020-06-10 ENCOUNTER — APPOINTMENT (OUTPATIENT)
Dept: NEUROLOGY | Facility: CLINIC | Age: 65
End: 2020-06-10

## 2020-09-06 ENCOUNTER — OUTPATIENT (OUTPATIENT)
Dept: OUTPATIENT SERVICES | Facility: HOSPITAL | Age: 65
LOS: 1 days | Discharge: HOME | End: 2020-09-06

## 2020-09-06 ENCOUNTER — LABORATORY RESULT (OUTPATIENT)
Age: 65
End: 2020-09-06

## 2020-09-06 DIAGNOSIS — Z11.59 ENCOUNTER FOR SCREENING FOR OTHER VIRAL DISEASES: ICD-10-CM

## 2020-09-06 DIAGNOSIS — Z98.82 BREAST IMPLANT STATUS: Chronic | ICD-10-CM

## 2020-09-06 DIAGNOSIS — Z90.710 ACQUIRED ABSENCE OF BOTH CERVIX AND UTERUS: Chronic | ICD-10-CM

## 2020-09-06 DIAGNOSIS — Z98.890 OTHER SPECIFIED POSTPROCEDURAL STATES: Chronic | ICD-10-CM

## 2020-09-09 ENCOUNTER — APPOINTMENT (OUTPATIENT)
Dept: NEUROLOGY | Facility: CLINIC | Age: 65
End: 2020-09-09
Payer: MEDICARE

## 2020-09-09 DIAGNOSIS — G51.31 CLONIC HEMIFACIAL SPASM, RIGHT: ICD-10-CM

## 2020-09-09 PROCEDURE — 64612 DESTROY NERVE FACE MUSCLE: CPT

## 2020-09-09 RX ADMIN — ONABOTULINUMTOXINA 0 UNIT: 100 INJECTION, POWDER, LYOPHILIZED, FOR SOLUTION INTRADERMAL; INTRAMUSCULAR at 00:00

## 2020-09-09 NOTE — PROCEDURE
[FreeTextEntry1] : Botox Injection [FreeTextEntry2] : Blepharospasm of right eye and right hemifacial spasm [FreeTextEntry4] : none [FreeTextEntry3] : :  Dr. Power \par Anesthesia: none. \par Procedure Note: Procedure explained. Risk of bleeding infection, pain, swallow difficulty, facial droop, eyelid droop, neck weakness, generalized weakness explained. Consent obtained. Next a 100 unit vial of Onabotulinum toxin type A, Lot # [id number], exp. date 02/2023 was mixed with 4 cc's of normal saline for a dilution of 2.5 units per 0.1 ml. Next injections were performed as follows using a 30 gauge 1/2" needle:\par \par Right orbicularis oculis inferomedial: 3.75 units divided over three locations. Right orbicularis oculis inferolateral: 3.75 units divided over three locations. Right orbicularis oculis superomedial: 1.25 units.  Right orbicularis oculis superolateral:  1.25 units.  Patient experienced increased right facial droop after a previous Botox injections so declined zygomaticus nasalis, and orbicularis oris injections today. \par \par Total botox injected = 10 units\par Total botox discarded = 90 units\par \par Complications: none.

## 2020-09-10 ENCOUNTER — MED ADMIN CHARGE (OUTPATIENT)
Age: 65
End: 2020-09-10

## 2020-09-10 RX ORDER — ONABOTULINUMTOXINA 100 [USP'U]/1
100 INJECTION, POWDER, LYOPHILIZED, FOR SOLUTION INTRADERMAL; INTRAMUSCULAR
Qty: 1 | Refills: 0 | Status: COMPLETED | OUTPATIENT
Start: 2020-09-09

## 2020-09-18 ENCOUNTER — APPOINTMENT (OUTPATIENT)
Dept: OBGYN | Facility: CLINIC | Age: 65
End: 2020-09-18
Payer: MEDICARE

## 2020-09-18 VITALS
SYSTOLIC BLOOD PRESSURE: 114 MMHG | BODY MASS INDEX: 22.43 KG/M2 | DIASTOLIC BLOOD PRESSURE: 76 MMHG | WEIGHT: 133 LBS | HEIGHT: 64.5 IN

## 2020-09-18 DIAGNOSIS — Z01.419 ENCOUNTER FOR GYNECOLOGICAL EXAMINATION (GENERAL) (ROUTINE) W/OUT ABNORMAL FINDINGS: ICD-10-CM

## 2020-09-18 DIAGNOSIS — Z87.59 PERSONAL HISTORY OF OTHER COMPLICATIONS OF PREGNANCY, CHILDBIRTH AND THE PUERPERIUM: ICD-10-CM

## 2020-09-18 PROCEDURE — 99386 PREV VISIT NEW AGE 40-64: CPT

## 2020-09-18 RX ORDER — ESCITALOPRAM OXALATE 5 MG/1
5 TABLET ORAL DAILY
Qty: 30 | Refills: 0 | Status: DISCONTINUED | COMMUNITY
Start: 2019-07-02 | End: 2020-09-18

## 2020-09-18 RX ORDER — AMANTADINE HYDROCHLORIDE 100 1/1
100 TABLET ORAL TWICE DAILY
Qty: 60 | Refills: 5 | Status: DISCONTINUED | COMMUNITY
Start: 2020-03-03 | End: 2020-09-18

## 2020-09-18 NOTE — HISTORY OF PRESENT ILLNESS
[Difficulty with Tamms] : difficulty with intercourse [Currently In Menopause] : currently in menopause [Previously active] : previously active [Men] : men [Vaginal] : vaginal [No] : No [FreeTextEntry1] : 66 yo , post menopausal female, s/p "partial hysterectomy" at the age of 36 for fibroids here for annual exam. Pt recently had some internal itching that has improved [TextBox_4] : PMD Sara Quevedo [FreeTextEntry2] : last sexually active 1 year ago

## 2020-09-18 NOTE — PLAN
[FreeTextEntry1] : Pt here for annual exam. PAP/HPV/aptima collected. Breast mr ordered due to hx of ruptured breast implant. Primary care with pmd. Pt encouraged to use lubrication. Pt to rv in 1 year or prn

## 2020-09-18 NOTE — PHYSICAL EXAM
[Examination Of The Breasts] : a normal appearance [] : implants [No Masses] : no breast masses were palpable [Labia Majora] : normal [Labia Minora] : normal [Normal] : normal [Uterine Adnexae] : normal [No Tenderness] : no tenderness [Nl Sphincter Tone] : normal sphincter tone [FreeTextEntry6] : Bilateral implants [Absent] : absent

## 2020-09-22 LAB
A VAGINAE DNA VAG QL NAA+PROBE: NORMAL
BVAB2 DNA VAG QL NAA+PROBE: NORMAL
C KRUSEI DNA VAG QL NAA+PROBE: NEGATIVE
C TRACH RRNA SPEC QL NAA+PROBE: NEGATIVE
HPV HIGH+LOW RISK DNA PNL CVX: NOT DETECTED
MEGA1 DNA VAG QL NAA+PROBE: NORMAL
N GONORRHOEA RRNA SPEC QL NAA+PROBE: NEGATIVE
T VAGINALIS RRNA SPEC QL NAA+PROBE: NEGATIVE

## 2020-10-11 ENCOUNTER — LABORATORY RESULT (OUTPATIENT)
Age: 65
End: 2020-10-11

## 2020-10-11 ENCOUNTER — OUTPATIENT (OUTPATIENT)
Dept: OUTPATIENT SERVICES | Facility: HOSPITAL | Age: 65
LOS: 1 days | Discharge: HOME | End: 2020-10-11

## 2020-10-11 DIAGNOSIS — Z90.710 ACQUIRED ABSENCE OF BOTH CERVIX AND UTERUS: Chronic | ICD-10-CM

## 2020-10-11 DIAGNOSIS — Z98.890 OTHER SPECIFIED POSTPROCEDURAL STATES: Chronic | ICD-10-CM

## 2020-10-11 DIAGNOSIS — Z98.82 BREAST IMPLANT STATUS: Chronic | ICD-10-CM

## 2020-10-11 DIAGNOSIS — Z11.59 ENCOUNTER FOR SCREENING FOR OTHER VIRAL DISEASES: ICD-10-CM

## 2020-10-14 ENCOUNTER — APPOINTMENT (OUTPATIENT)
Dept: NEUROLOGY | Facility: CLINIC | Age: 65
End: 2020-10-14
Payer: MEDICARE

## 2020-10-14 PROCEDURE — 95910 NRV CNDJ TEST 7-8 STUDIES: CPT

## 2020-10-14 PROCEDURE — 95886 MUSC TEST DONE W/N TEST COMP: CPT

## 2020-10-14 RX ADMIN — GABAPENTIN 0 MG: 300 CAPSULE ORAL at 00:00

## 2020-11-23 ENCOUNTER — RESULT REVIEW (OUTPATIENT)
Age: 65
End: 2020-11-23

## 2020-11-23 ENCOUNTER — OUTPATIENT (OUTPATIENT)
Dept: OUTPATIENT SERVICES | Facility: HOSPITAL | Age: 65
LOS: 1 days | Discharge: HOME | End: 2020-11-23
Payer: MEDICARE

## 2020-11-23 DIAGNOSIS — Z98.82 BREAST IMPLANT STATUS: Chronic | ICD-10-CM

## 2020-11-23 DIAGNOSIS — Z90.710 ACQUIRED ABSENCE OF BOTH CERVIX AND UTERUS: Chronic | ICD-10-CM

## 2020-11-23 DIAGNOSIS — Z98.890 OTHER SPECIFIED POSTPROCEDURAL STATES: Chronic | ICD-10-CM

## 2020-11-23 DIAGNOSIS — T85.44XD CAPSULAR CONTRACTURE OF BREAST IMPLANT, SUBSEQUENT ENCOUNTER: ICD-10-CM

## 2020-11-23 PROCEDURE — 77047 MRI BREAST C- BILATERAL: CPT | Mod: 26

## 2020-12-06 ENCOUNTER — LABORATORY RESULT (OUTPATIENT)
Age: 65
End: 2020-12-06

## 2020-12-06 ENCOUNTER — OUTPATIENT (OUTPATIENT)
Dept: OUTPATIENT SERVICES | Facility: HOSPITAL | Age: 65
LOS: 1 days | Discharge: HOME | End: 2020-12-06

## 2020-12-06 DIAGNOSIS — Z98.890 OTHER SPECIFIED POSTPROCEDURAL STATES: Chronic | ICD-10-CM

## 2020-12-06 DIAGNOSIS — Z98.82 BREAST IMPLANT STATUS: Chronic | ICD-10-CM

## 2020-12-06 DIAGNOSIS — Z11.59 ENCOUNTER FOR SCREENING FOR OTHER VIRAL DISEASES: ICD-10-CM

## 2020-12-06 DIAGNOSIS — Z90.710 ACQUIRED ABSENCE OF BOTH CERVIX AND UTERUS: Chronic | ICD-10-CM

## 2020-12-09 ENCOUNTER — APPOINTMENT (OUTPATIENT)
Dept: NEUROLOGY | Facility: CLINIC | Age: 65
End: 2020-12-09
Payer: MEDICARE

## 2020-12-09 ENCOUNTER — APPOINTMENT (OUTPATIENT)
Dept: NEUROLOGY | Facility: CLINIC | Age: 65
End: 2020-12-09

## 2020-12-09 VITALS — TEMPERATURE: 97 F

## 2020-12-09 PROCEDURE — 64612 DESTROY NERVE FACE MUSCLE: CPT

## 2020-12-09 PROCEDURE — 99072 ADDL SUPL MATRL&STAF TM PHE: CPT

## 2020-12-09 RX ADMIN — ONABOTULINUMTOXINA 0 UNIT: 100 INJECTION, POWDER, LYOPHILIZED, FOR SOLUTION INTRADERMAL; INTRAMUSCULAR at 00:00

## 2020-12-09 NOTE — PROCEDURE
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 Power\par Operation: Procedure explained. Risk of bleeding infection, pain, swallow difficulty, facial droop, eyelid droop, neck weakness, generalized weakness explained. Consent obtained. Next a 100 unit vial of Onabotulinum toxin type A, Lot # [id number], exp. date 08/2023 was mixed with 4 cc's of normal saline for a dilution of 2.5 units per 0.1 ml. Next injections were performed as follows using a 30 gauge 1/2" needle:\par \par Right orbicularis oculis inferomedial: 3.75 units divided over three locations. Right orbicularis oculis inferolateral: 3.75 units divided over three locations. Right orbicularis oculis superomedial: 1.25 units. Right orbicularis oculis superolateral: 1.25 units. Patient experienced increased right facial droop after a previous Botox injections so declined zygomaticus nasalis, and orbicularis oris injections again today. \par \par Total botox injected = 10 units\par Total botox discarded = 90 units\par \par Complications: none.

## 2020-12-10 ENCOUNTER — MED ADMIN CHARGE (OUTPATIENT)
Age: 65
End: 2020-12-10

## 2020-12-10 RX ORDER — ONABOTULINUMTOXINA 100 [USP'U]/1
100 INJECTION, POWDER, LYOPHILIZED, FOR SOLUTION INTRADERMAL; INTRAMUSCULAR
Qty: 1 | Refills: 0 | Status: COMPLETED | OUTPATIENT
Start: 2020-12-09

## 2020-12-10 RX ORDER — ONABOTULINUMTOXINA 100 [USP'U]/1
100 INJECTION, POWDER, LYOPHILIZED, FOR SOLUTION INTRADERMAL; INTRAMUSCULAR
Qty: 1 | Refills: 0 | Status: COMPLETED | OUTPATIENT
Start: 2020-03-11

## 2021-03-12 ENCOUNTER — APPOINTMENT (OUTPATIENT)
Dept: OBGYN | Facility: CLINIC | Age: 66
End: 2021-03-12

## 2021-05-05 ENCOUNTER — APPOINTMENT (OUTPATIENT)
Dept: NEUROLOGY | Facility: CLINIC | Age: 66
End: 2021-05-05
Payer: MEDICARE

## 2021-05-05 PROCEDURE — 64612 DESTROY NERVE FACE MUSCLE: CPT

## 2021-05-05 PROCEDURE — 99072 ADDL SUPL MATRL&STAF TM PHE: CPT

## 2021-05-05 RX ADMIN — ONABOTULINUMTOXINA 0 UNIT: 100 INJECTION, POWDER, LYOPHILIZED, FOR SOLUTION INTRADERMAL; INTRAMUSCULAR at 00:00

## 2021-05-05 NOTE — PROCEDURE
[FreeTextEntry1] : Botox injection [FreeTextEntry2] : Blepharospasm right eye [FreeTextEntry3] : Anesthesia: none. \par Procedure Note: : Dr. Power\par Operation: Procedure explained. Risk of bleeding infection, pain, swallow difficulty, facial droop, eyelid droop, neck weakness, generalized weakness explained. Consent obtained. Next a 100 unit vial of Onabotulinum toxin type A, Lot # [id number], exp. date 12/2023 was mixed with 4 cc's of normal saline for a dilution of 2.5 units per 0.1 ml.  Next injections were performed as follows using a 30 gauge 1/2" needle:\par \par Right orbicularis oculis inferomedial: 3.75 units divided over three locations. Right orbicularis oculis inferolateral: 3.75 units divided over three locations. Right orbicularis oculis superomedial: 1.25 units. Right orbicularis oculis superolateral: 1.25 units. Patient experienced increased right facial droop after a previous Botox injections so declined zygomaticus nasalis, and orbicularis oris injections again today. \par \par Total botox injected = 10 units\par Total botox discarded = 90 units\par \par Complications: none. [FreeTextEntry4] : none

## 2021-05-06 ENCOUNTER — MED ADMIN CHARGE (OUTPATIENT)
Age: 66
End: 2021-05-06

## 2021-05-06 RX ORDER — ONABOTULINUMTOXINA 100 [USP'U]/1
100 INJECTION, POWDER, LYOPHILIZED, FOR SOLUTION INTRADERMAL; INTRAMUSCULAR
Qty: 1 | Refills: 0 | Status: COMPLETED | OUTPATIENT
Start: 2021-05-05

## 2021-05-24 ENCOUNTER — OUTPATIENT (OUTPATIENT)
Dept: OUTPATIENT SERVICES | Facility: HOSPITAL | Age: 66
LOS: 1 days | Discharge: HOME | End: 2021-05-24
Payer: MEDICARE

## 2021-05-24 ENCOUNTER — RESULT REVIEW (OUTPATIENT)
Age: 66
End: 2021-05-24

## 2021-05-24 DIAGNOSIS — Z90.710 ACQUIRED ABSENCE OF BOTH CERVIX AND UTERUS: Chronic | ICD-10-CM

## 2021-05-24 DIAGNOSIS — Z98.890 OTHER SPECIFIED POSTPROCEDURAL STATES: Chronic | ICD-10-CM

## 2021-05-24 DIAGNOSIS — G35 MULTIPLE SCLEROSIS: ICD-10-CM

## 2021-05-24 DIAGNOSIS — Z98.82 BREAST IMPLANT STATUS: Chronic | ICD-10-CM

## 2021-05-24 PROCEDURE — 70553 MRI BRAIN STEM W/O & W/DYE: CPT | Mod: 26

## 2021-07-15 ENCOUNTER — OUTPATIENT (OUTPATIENT)
Dept: OUTPATIENT SERVICES | Facility: HOSPITAL | Age: 66
LOS: 1 days | Discharge: HOME | End: 2021-07-15

## 2021-07-15 ENCOUNTER — NON-APPOINTMENT (OUTPATIENT)
Age: 66
End: 2021-07-15

## 2021-07-15 ENCOUNTER — APPOINTMENT (OUTPATIENT)
Dept: GERIATRICS | Facility: CLINIC | Age: 66
End: 2021-07-15
Payer: MEDICARE

## 2021-07-15 VITALS
HEART RATE: 96 BPM | BODY MASS INDEX: 23.1 KG/M2 | TEMPERATURE: 97 F | DIASTOLIC BLOOD PRESSURE: 80 MMHG | WEIGHT: 137 LBS | SYSTOLIC BLOOD PRESSURE: 126 MMHG | HEIGHT: 64.5 IN | OXYGEN SATURATION: 97 %

## 2021-07-15 DIAGNOSIS — Z90.710 ACQUIRED ABSENCE OF BOTH CERVIX AND UTERUS: Chronic | ICD-10-CM

## 2021-07-15 DIAGNOSIS — Z98.890 OTHER SPECIFIED POSTPROCEDURAL STATES: Chronic | ICD-10-CM

## 2021-07-15 DIAGNOSIS — L30.9 DERMATITIS, UNSPECIFIED: ICD-10-CM

## 2021-07-15 DIAGNOSIS — Z98.82 BREAST IMPLANT STATUS: Chronic | ICD-10-CM

## 2021-07-15 PROCEDURE — 99214 OFFICE O/P EST MOD 30 MIN: CPT

## 2021-07-15 RX ORDER — TRIAMCINOLONE ACETONIDE 1 MG/G
0.1 CREAM TOPICAL TWICE DAILY
Qty: 1 | Refills: 5 | Status: ACTIVE | COMMUNITY
Start: 2021-07-15 | End: 1900-01-01

## 2021-07-15 RX ORDER — VARENICLINE TARTRATE 1 MG/1
1 TABLET, FILM COATED ORAL
Qty: 1 | Refills: 0 | Status: ACTIVE | COMMUNITY
Start: 2021-07-15 | End: 1900-01-01

## 2021-07-15 RX ORDER — DIAZEPAM 5 MG/1
5 TABLET ORAL
Qty: 2 | Refills: 0 | Status: DISCONTINUED | COMMUNITY
Start: 2021-05-05 | End: 2021-07-15

## 2021-07-15 NOTE — HISTORY OF PRESENT ILLNESS
[de-identified] : 66 year old female PMH MS with chronic toe numbness not on meds, follows with Dr. Power, no flares since 2019, GERD, here to establish care.\par Pt. with no complaints, no chest pain, does have SOB when walks up stairs worse over past 3 months, better at rest follows with Dr. Sims, recent stress test negative. \par Last c-scope with 2015 reports to be negative.\par Active smoker smokes 1/2 pack daily. Agreeable to try chantix.

## 2021-07-15 NOTE — ASSESSMENT
[FreeTextEntry1] : 66 year old female PMH MS with chronic toe numbness not on meds, follows with Dr. Power, no flares since 2019, GERD, here to establish care.\par Pt. with no complaints, no chest pain, does have SOB when walks up stairs worse over past 3 months, better at rest follows with Dr. Sims, recent stress test negative. \par Last c-scope with 2015 reports to be negative.\par Active smoker smokes 1/2 pack daily. Agreeable to try chantix.  \par \par #MS controlled off meds- continue to follow up with Dr. Power\par #dry eczematous skin- triamcinolone cream\par #Smoking cessation- start trial of chantix\par #Routine blood work ordered\par -records to be sent from Dr. Mcqueen office

## 2021-07-15 NOTE — REVIEW OF SYSTEMS
[Shortness Of Breath] : shortness of breath [Fever] : no fever [Chills] : no chills [Fatigue] : no fatigue [Chest Pain] : no chest pain [Palpitations] : no palpitations [Leg Claudication] : no leg claudication [Orthopnea] : no orthopnea [Wheezing] : no wheezing [Cough] : no cough [Abdominal Pain] : no abdominal pain [Nausea] : no nausea [Constipation] : no constipation

## 2021-07-15 NOTE — END OF VISIT
[] : Resident [FreeTextEntry3] : SEEN WITH [facility] TE[facility]; patient known to me from out patient practice; will have office data transferred over for record here.  Contiue with care plan as above; noted mild eczema as well, treat with topical steroids.

## 2021-08-04 ENCOUNTER — NON-APPOINTMENT (OUTPATIENT)
Age: 66
End: 2021-08-04

## 2021-08-04 ENCOUNTER — APPOINTMENT (OUTPATIENT)
Dept: NEUROLOGY | Facility: CLINIC | Age: 66
End: 2021-08-04
Payer: MEDICARE

## 2021-08-04 PROCEDURE — 64612 DESTROY NERVE FACE MUSCLE: CPT

## 2021-08-04 RX ORDER — ONABOTULINUMTOXINA 100 [USP'U]/1
100 INJECTION, POWDER, LYOPHILIZED, FOR SOLUTION INTRADERMAL; INTRAMUSCULAR
Qty: 1 | Refills: 0 | Status: COMPLETED | OUTPATIENT
Start: 2021-08-04

## 2021-08-04 RX ADMIN — ONABOTULINUMTOXINA 0 UNIT: 100 INJECTION, POWDER, LYOPHILIZED, FOR SOLUTION INTRADERMAL; INTRAMUSCULAR at 00:00

## 2021-08-04 NOTE — PROCEDURE
[FreeTextEntry1] : Botox injection [FreeTextEntry2] : Blepharospasm [FreeTextEntry4] : none [FreeTextEntry3] : Procedure Performed: Botox injection. \par Indications: Blepharospasm right eye. \par Anesthesia: none. \par Procedure Note: Anesthesia: none. \par Procedure Note: : Dr. Power\par Operation: Procedure explained. Risk of bleeding infection, pain, swallow difficulty, facial droop, eyelid droop, neck weakness, generalized weakness explained. Consent obtained. Next a 100 unit vial of Onabotulinum toxin type A, Lot # [id number], exp. date 10/2023 was mixed with 4 cc's of normal saline for a dilution of 2.5 units per 0.1 ml. Next injections were performed as follows using a 30 gauge 1/2" needle:\par \par Right orbicularis oculis inferomedial: 3.75 units divided over three locations. Right orbicularis oculis inferolateral: 3.75 units divided over three locations. Right orbicularis oculis superomedial: 1.25 units. Right orbicularis oculis superolateral: 1.25 units. Patient experienced increased right facial droop after a previous Botox injections so declined zygomaticus nasalis, and orbicularis oris injections again today. \par \par Total botox injected = 10 units\par Total botox discarded = 90 units\par \par Complications: none. \par  \par

## 2021-08-25 ENCOUNTER — NON-APPOINTMENT (OUTPATIENT)
Age: 66
End: 2021-08-25

## 2021-09-29 RX ORDER — SULFAMETHOXAZOLE AND TRIMETHOPRIM 800; 160 MG/1; MG/1
800-160 TABLET ORAL
Qty: 10 | Refills: 0 | Status: ACTIVE | COMMUNITY
Start: 2021-09-29 | End: 1900-01-01

## 2021-11-10 ENCOUNTER — APPOINTMENT (OUTPATIENT)
Dept: NEUROLOGY | Facility: CLINIC | Age: 66
End: 2021-11-10
Payer: MEDICARE

## 2021-11-10 VITALS — BODY MASS INDEX: 22.49 KG/M2 | TEMPERATURE: 94 F | WEIGHT: 135 LBS | HEIGHT: 65 IN

## 2021-11-10 DIAGNOSIS — R20.0 ANESTHESIA OF SKIN: ICD-10-CM

## 2021-11-10 DIAGNOSIS — R20.2 ANESTHESIA OF SKIN: ICD-10-CM

## 2021-11-10 PROCEDURE — 99214 OFFICE O/P EST MOD 30 MIN: CPT | Mod: 25

## 2021-11-10 PROCEDURE — 64615 CHEMODENERV MUSC MIGRAINE: CPT

## 2021-11-10 NOTE — PROCEDURE
[FreeTextEntry1] : Botox injection [FreeTextEntry2] : Blepharospasm [FreeTextEntry4] : none [FreeTextEntry3] : Procedure Performed: Botox injection. \par Indications: Blepharospasm right eye. \par Anesthesia: none. \par Procedure Note: Anesthesia: none. \par Procedure Note: : Dr. Power\par Operation: Procedure explained. Risk of bleeding infection, pain, swallow difficulty, facial droop, eyelid droop, neck weakness, generalized weakness explained. Consent obtained. Next a 100 unit vial of Onabotulinum toxin type A, Lot # [id number], exp. date 02/2024 was mixed with 4 cc's of normal saline for a dilution of 2.5 units per 0.1 ml. Next injections were performed as follows using a 30 gauge 1/2" needle:\par \par Right orbicularis oculis inferomedial: 3.75 units divided over three locations. Right orbicularis oculis inferolateral: 3.75 units divided over three locations. Right orbicularis oculis superomedial: 1.25 units. Right orbicularis oculis superolateral: 1.25 units. Patient experienced increased right facial droop after a previous Botox injections so declined zygomaticus nasalis, and orbicularis oris injections again today. \par \par Total botox injected = 10 units\par Total botox discarded = 90 units\par \par Complications: none. \par \par Patient reports Botox effect within a day and it lasting only a month and a half.  Aside from the short duration she is satisfied with the relief and did not want to change anything.\par

## 2021-11-10 NOTE — HISTORY OF PRESENT ILLNESS
[FreeTextEntry1] : Pt seen today in f/u for MS.  Has been on tecfidera in past that gave her many GI side effects she is still dealing with.  Subsequently was on Avonex weekly but made her feel like she had cold 3 days a week.  She is currently not on any disease modifying therapy.\par \par She is receiving Botox injections every 3 months for blepharospasm of right eye.  Has some hemifacial spasm too but when injected there previously reported increase in facial droop.\par \par MRI brain's reviewed from 2013 to 2019 and were stable, mild to moderate periventricular and subcortical WM changes.  \par \par States lately experiencing paresthesias in toes, no weakness.  Denies falls.\par \par I did EMG/NCS on her in 2020 for the symptoms and this was negative for radiculopathy or large fiber neuropathy.

## 2021-11-10 NOTE — PROCEDURE
[FreeTextEntry1] : Botox injection [FreeTextEntry2] : Blepharospasm [FreeTextEntry4] : none [FreeTextEntry3] : Procedure Performed: Botox injection. \par Indications: Blepharospasm right eye. \par Anesthesia: none. \par Procedure Note: Anesthesia: none. \par Procedure Note: : Dr. Power\par Operation: Procedure explained. Risk of bleeding infection, pain, swallow difficulty, facial droop, eyelid droop, neck weakness, generalized weakness explained. Consent obtained. Next a 100 unit vial of Onabotulinum toxin type A, Lot # [id number], exp. date 02/2024 was mixed with 4 cc's of normal saline for a dilution of 2.5 units per 0.1 ml. Next injections were performed as follows using a 30 gauge 1/2" needle:\par \par Right orbicularis oculis inferomedial: 3.75 units divided over three locations. Right orbicularis oculis inferolateral: 3.75 units divided over three locations. Right orbicularis oculis superomedial: 1.25 units. Right orbicularis oculis superolateral: 1.25 units. Patient experienced increased right facial droop after a previous Botox injections so declined zygomaticus nasalis, and orbicularis oris injections again today. \par \par Total botox injected = 10 units\par Total botox discarded = 90 units\par \par Complications: none. \par \par Patient reports Botox effect within a day and it lasting only a month and a half.  Aside from the short duration she is satisfied with the relief and did not want to change anything.\par

## 2021-11-10 NOTE — PHYSICAL EXAM
[FreeTextEntry1] : 5/5 strength in prox/distal LE's.\par Reflexes intact.\par No clonus, no upgoing toes.\par Position sensation is decreased in big toes and vibration also reduced slightly.\par She is able to ambulate independently.

## 2021-11-10 NOTE — ASSESSMENT
[FreeTextEntry1] : 1.  Multiple sclerosis with stable imaging.  She does not desire to be on disease modifying therapy at this time.  The paresthesias in lower extremities were present a year ago and she had negative EMG/NCS so probably are related to her MS.  Will check B12/folate, TSH.  She should continue vitamin D therapy.\par 2.  Blepharospasm on right.  She will receive botox injection today.\par 3.  Return in 5-6 months.

## 2021-12-30 ENCOUNTER — TRANSCRIPTION ENCOUNTER (OUTPATIENT)
Age: 66
End: 2021-12-30

## 2022-01-07 ENCOUNTER — EMERGENCY (EMERGENCY)
Facility: HOSPITAL | Age: 67
LOS: 0 days | Discharge: HOME | End: 2022-01-07
Attending: EMERGENCY MEDICINE | Admitting: EMERGENCY MEDICINE
Payer: MEDICARE

## 2022-01-07 VITALS
OXYGEN SATURATION: 99 % | HEART RATE: 75 BPM | RESPIRATION RATE: 18 BRPM | DIASTOLIC BLOOD PRESSURE: 77 MMHG | TEMPERATURE: 97 F | SYSTOLIC BLOOD PRESSURE: 121 MMHG

## 2022-01-07 VITALS
RESPIRATION RATE: 18 BRPM | HEART RATE: 84 BPM | SYSTOLIC BLOOD PRESSURE: 128 MMHG | OXYGEN SATURATION: 98 % | WEIGHT: 134.92 LBS | DIASTOLIC BLOOD PRESSURE: 86 MMHG | HEIGHT: 65 IN | TEMPERATURE: 98 F

## 2022-01-07 DIAGNOSIS — F17.200 NICOTINE DEPENDENCE, UNSPECIFIED, UNCOMPLICATED: ICD-10-CM

## 2022-01-07 DIAGNOSIS — M25.561 PAIN IN RIGHT KNEE: ICD-10-CM

## 2022-01-07 DIAGNOSIS — M79.661 PAIN IN RIGHT LOWER LEG: ICD-10-CM

## 2022-01-07 DIAGNOSIS — G35 MULTIPLE SCLEROSIS: ICD-10-CM

## 2022-01-07 DIAGNOSIS — Z98.890 OTHER SPECIFIED POSTPROCEDURAL STATES: Chronic | ICD-10-CM

## 2022-01-07 DIAGNOSIS — I83.93 ASYMPTOMATIC VARICOSE VEINS OF BILATERAL LOWER EXTREMITIES: ICD-10-CM

## 2022-01-07 DIAGNOSIS — Z98.82 BREAST IMPLANT STATUS: Chronic | ICD-10-CM

## 2022-01-07 DIAGNOSIS — M79.662 PAIN IN LEFT LOWER LEG: ICD-10-CM

## 2022-01-07 DIAGNOSIS — Z88.0 ALLERGY STATUS TO PENICILLIN: ICD-10-CM

## 2022-01-07 DIAGNOSIS — Z90.710 ACQUIRED ABSENCE OF BOTH CERVIX AND UTERUS: Chronic | ICD-10-CM

## 2022-01-07 DIAGNOSIS — M25.562 PAIN IN LEFT KNEE: ICD-10-CM

## 2022-01-07 LAB
ALBUMIN SERPL ELPH-MCNC: 3.7 G/DL — SIGNIFICANT CHANGE UP (ref 3.5–5.2)
ALP SERPL-CCNC: 92 U/L — SIGNIFICANT CHANGE UP (ref 30–115)
ALT FLD-CCNC: 64 U/L — HIGH (ref 0–41)
ANION GAP SERPL CALC-SCNC: 15 MMOL/L — HIGH (ref 7–14)
AST SERPL-CCNC: 33 U/L — SIGNIFICANT CHANGE UP (ref 0–41)
BASOPHILS # BLD AUTO: 0 K/UL — SIGNIFICANT CHANGE UP (ref 0–0.2)
BASOPHILS NFR BLD AUTO: 0 % — SIGNIFICANT CHANGE UP (ref 0–1)
BILIRUB SERPL-MCNC: 0.3 MG/DL — SIGNIFICANT CHANGE UP (ref 0.2–1.2)
BUN SERPL-MCNC: 20 MG/DL — SIGNIFICANT CHANGE UP (ref 10–20)
CALCIUM SERPL-MCNC: 9.1 MG/DL — SIGNIFICANT CHANGE UP (ref 8.5–10.1)
CHLORIDE SERPL-SCNC: 102 MMOL/L — SIGNIFICANT CHANGE UP (ref 98–110)
CO2 SERPL-SCNC: 18 MMOL/L — SIGNIFICANT CHANGE UP (ref 17–32)
CREAT SERPL-MCNC: 0.9 MG/DL — SIGNIFICANT CHANGE UP (ref 0.7–1.5)
EOSINOPHIL # BLD AUTO: 0.14 K/UL — SIGNIFICANT CHANGE UP (ref 0–0.7)
EOSINOPHIL NFR BLD AUTO: 0.9 % — SIGNIFICANT CHANGE UP (ref 0–8)
GIANT PLATELETS BLD QL SMEAR: PRESENT — SIGNIFICANT CHANGE UP
GLUCOSE SERPL-MCNC: 93 MG/DL — SIGNIFICANT CHANGE UP (ref 70–99)
HCT VFR BLD CALC: 41.7 % — SIGNIFICANT CHANGE UP (ref 37–47)
HGB BLD-MCNC: 14.1 G/DL — SIGNIFICANT CHANGE UP (ref 12–16)
LYMPHOCYTES # BLD AUTO: 35.9 % — SIGNIFICANT CHANGE UP (ref 20.5–51.1)
LYMPHOCYTES # BLD AUTO: 5.56 K/UL — HIGH (ref 1.2–3.4)
MACROCYTES BLD QL: SLIGHT — SIGNIFICANT CHANGE UP
MAGNESIUM SERPL-MCNC: 2 MG/DL — SIGNIFICANT CHANGE UP (ref 1.8–2.4)
MANUAL SMEAR VERIFICATION: SIGNIFICANT CHANGE UP
MCHC RBC-ENTMCNC: 32.6 PG — HIGH (ref 27–31)
MCHC RBC-ENTMCNC: 33.8 G/DL — SIGNIFICANT CHANGE UP (ref 32–37)
MCV RBC AUTO: 96.5 FL — SIGNIFICANT CHANGE UP (ref 81–99)
MONOCYTES # BLD AUTO: 1.36 K/UL — HIGH (ref 0.1–0.6)
MONOCYTES NFR BLD AUTO: 8.8 % — SIGNIFICANT CHANGE UP (ref 1.7–9.3)
NEUTROPHILS # BLD AUTO: 7.89 K/UL — HIGH (ref 1.4–6.5)
NEUTROPHILS NFR BLD AUTO: 50.9 % — SIGNIFICANT CHANGE UP (ref 42.2–75.2)
NRBC # BLD: 1 /100 — HIGH (ref 0–0)
NRBC # BLD: SIGNIFICANT CHANGE UP /100 WBCS (ref 0–0)
PLAT MORPH BLD: NORMAL — SIGNIFICANT CHANGE UP
PLATELET # BLD AUTO: 498 K/UL — HIGH (ref 130–400)
POLYCHROMASIA BLD QL SMEAR: SLIGHT — SIGNIFICANT CHANGE UP
POTASSIUM SERPL-MCNC: 4.2 MMOL/L — SIGNIFICANT CHANGE UP (ref 3.5–5)
POTASSIUM SERPL-SCNC: 4.2 MMOL/L — SIGNIFICANT CHANGE UP (ref 3.5–5)
PROT SERPL-MCNC: 6.2 G/DL — SIGNIFICANT CHANGE UP (ref 6–8)
RBC # BLD: 4.32 M/UL — SIGNIFICANT CHANGE UP (ref 4.2–5.4)
RBC # FLD: 13.6 % — SIGNIFICANT CHANGE UP (ref 11.5–14.5)
RBC BLD AUTO: ABNORMAL
SODIUM SERPL-SCNC: 135 MMOL/L — SIGNIFICANT CHANGE UP (ref 135–146)
VARIANT LYMPHS # BLD: 3.5 % — SIGNIFICANT CHANGE UP (ref 0–5)
WBC # BLD: 15.5 K/UL — HIGH (ref 4.8–10.8)
WBC # FLD AUTO: 15.5 K/UL — HIGH (ref 4.8–10.8)

## 2022-01-07 PROCEDURE — 99284 EMERGENCY DEPT VISIT MOD MDM: CPT

## 2022-01-07 RX ORDER — KETOROLAC TROMETHAMINE 30 MG/ML
15 SYRINGE (ML) INJECTION ONCE
Refills: 0 | Status: DISCONTINUED | OUTPATIENT
Start: 2022-01-07 | End: 2022-01-07

## 2022-01-07 RX ORDER — DEXAMETHASONE 0.5 MG/5ML
10 ELIXIR ORAL ONCE
Refills: 0 | Status: COMPLETED | OUTPATIENT
Start: 2022-01-07 | End: 2022-01-07

## 2022-01-07 RX ORDER — SODIUM CHLORIDE 9 MG/ML
1000 INJECTION, SOLUTION INTRAVENOUS ONCE
Refills: 0 | Status: COMPLETED | OUTPATIENT
Start: 2022-01-07 | End: 2022-01-07

## 2022-01-07 RX ADMIN — Medication 102 MILLIGRAM(S): at 04:23

## 2022-01-07 RX ADMIN — Medication 15 MILLIGRAM(S): at 04:24

## 2022-01-07 RX ADMIN — SODIUM CHLORIDE 1000 MILLILITER(S): 9 INJECTION, SOLUTION INTRAVENOUS at 04:24

## 2022-01-07 NOTE — ED PROVIDER NOTE - PATIENT PORTAL LINK FT
You can access the FollowMyHealth Patient Portal offered by [facility] by registering at the following website: [email or web address]. By joining [employer]’s FollowMyHealth portal, you will also be able to view your health information using other applications (apps) compatible with our system.

## 2022-01-07 NOTE — ED ADULT NURSE NOTE - NSIMPLEMENTINTERV_GEN_ALL_ED
Implemented All Universal Safety Interventions:  [place] to call system. Call bell, personal items and telephone within reach. Instruct patient to call for assistance. Room bathroom lighting operational. Non-slip footwear when patient is off stretcher. Physically safe environment: no spills, clutter or unnecessary equipment. Stretcher in lowest position, wheels locked, appropriate side rails in place.

## 2022-01-07 NOTE — ED ADULT TRIAGE NOTE - CHIEF COMPLAINT QUOTE
Pt c/o of b/l LE leg pain starting 10pm on 1/7/22. Pt took Motrin earlier which helped but pain came back this morning.

## 2022-01-07 NOTE — ED PROVIDER NOTE - NSFOLLOWUPINSTRUCTIONS_ED_ALL_ED_FT
Please keep your appointment with your Neurologist Dr. Power for 1/13/22.     Return to the Emergency Room for any new or worsening symptoms.

## 2022-01-07 NOTE — ED PROVIDER NOTE - ATTENDING CONTRIBUTION TO CARE
66yoF with h/o MS on no meds, presents with pain to the b/l knees, sharp in character, worse with ambulation, since 1030pm last night. Significantly improved with ibuprofen she took at home. Also states she has noted swelling of veins to her b/l calves with discoloration since that time. Had cough last week that has since resolved, and was on azith and dexamethasone until she finished this yesterday. Denies recent long distance travel, hormone use, h/o VTE or nuclear FHx of VTE, CP, SOB, fever, v, cough, back pain, weakness and numbness of her extremities, insect bite or plant prick, and all other symptoms. On exam, afebrile, hemodynamically stable, saturating well, NAD, well appearing, sitting comfortably in bed, no WOB, speaking full sentences, head NCAT, EOMI grossly, anicteric, MMM, no JVD, RRR, nml S1/S2, no m/r/g, lungs CTAB, no w/r/r, abd soft, NT, ND, nml BS, no rebound or guarding, AAO, CN's 3-12 grossly intact, HERNANDES spontaneously, no leg cyanosis or edema, noted faint venous prominence to b/l legs with no erythema/crepitus, appearance skin warm, well perfused. 66yoF with h/o MS on no meds, presents with pain to the b/l knees, sharp in character, worse with ambulation, since 1030pm last night. Significantly improved with ibuprofen she took at home. Also states she has noted swelling of veins to her b/l calves with discoloration since that time. Had cough last week that has since resolved, and was on azith and dexamethasone until she finished this yesterday. Denies recent long distance travel, hormone use, h/o VTE or nuclear FHx of VTE, CP, SOB, fever, v, cough, back pain, weakness and numbness of her extremities, insect bite or plant prick, and all other symptoms. On exam, afebrile, hemodynamically stable, saturating well, NAD, well appearing, sitting comfortably in bed, no WOB, speaking full sentences, head NCAT, EOMI grossly, anicteric, MMM, no JVD, RRR, nml S1/S2, no m/r/g, lungs CTAB, no w/r/r, abd soft, NT, ND, nml BS, no rebound or guarding, AAO, CN's 3-12 grossly intact, HERNANDES spontaneously, no leg cyanosis or edema, noted faint venous prominence to b/l legs with no erythema/crepitus, appearance skin warm, well perfused. Character/b/l nature low suspicion for DVT and no risk factors for this. No e/o cellulitis, nec fasc, limb ischemia, compartment syndrome. 66yoF with h/o MS on no meds, presents with pain to the b/l knees, sharp in character, worse with ambulation, since 1030pm last night. Significantly improved with ibuprofen she took at home. Also states she has noted swelling of veins to her b/l calves with discoloration since that time. Had cough last week that has since resolved, and was on azith and dexamethasone until she finished this yesterday. Denies recent long distance travel, hormone use, h/o VTE or nuclear FHx of VTE, CP, SOB, fever, v, cough, back pain, weakness and numbness of her extremities, insect bite or plant prick, and all other symptoms. On exam, afebrile, hemodynamically stable, saturating well, NAD, well appearing, sitting comfortably in bed, no WOB, speaking full sentences, head NCAT, EOMI grossly, anicteric, MMM, no JVD, RRR, nml S1/S2, no m/r/g, lungs CTAB, no w/r/r, abd soft, NT, ND, nml BS, no rebound or guarding, AAO, CN's 3-12 grossly intact, HERNANDES spontaneously, no leg cyanosis or edema, noted faint venous prominence to b/l legs with no erythema/crepitus, appearance skin warm, well perfused. Character/b/l nature low suspicion for DVT and no risk factors for this. No e/o cellulitis, nec fasc, limb ischemia, compartment syndrome. No electrolyte abnormalities. Mild leukocytosis may be 2/2 steroids. Given decadron, Toradol, fluids with resolution of symptoms. Patient is well appearing, NAD, afebrile, hemodynamically stable. Any available tests and studies were discussed with patient and family. Discharged with instructions in further symptomatic care, return precautions, and need for neuro f/u which she already has scheduled.

## 2022-01-07 NOTE — ED PROVIDER NOTE - NSICDXPASTSURGICALHX_GEN_ALL_CORE_FT
PAST SURGICAL HISTORY:  H/O breast augmentation     H/O: hysterectomy     History of facial surgery

## 2022-01-07 NOTE — ED PROVIDER NOTE - PROGRESS NOTE DETAILS
Pt's leg pain is completely resolved. She has an appointment on 1/13 which Dr. Power. Will continue Motrin at home PRN. Ready for dc.

## 2022-01-07 NOTE — ED PROVIDER NOTE - CLINICAL SUMMARY MEDICAL DECISION MAKING FREE TEXT BOX
66yoF with h/o MS on no meds, presents with pain to the b/l knees, sharp in character, worse with ambulation, since 1030pm last night. Significantly improved with ibuprofen she took at home. Also states she has noted swelling of veins to her b/l calves with discoloration since that time. Had cough last week that has since resolved, and was on azith and dexamethasone until she finished this yesterday. Denies recent long distance travel, hormone use, h/o VTE or nuclear FHx of VTE, CP, SOB, fever, v, cough, back pain, weakness and numbness of her extremities, insect bite or plant prick, and all other symptoms. On exam, afebrile, hemodynamically stable, saturating well, NAD, well appearing, sitting comfortably in bed, no WOB, speaking full sentences, head NCAT, EOMI grossly, anicteric, MMM, no JVD, RRR, nml S1/S2, no m/r/g, lungs CTAB, no w/r/r, abd soft, NT, ND, nml BS, no rebound or guarding, AAO, CN's 3-12 grossly intact, HERNANDES spontaneously, no leg cyanosis or edema, noted faint venous prominence to b/l legs with no erythema/crepitus, appearance skin warm, well perfused. Character/b/l nature low suspicion for DVT and no risk factors for this. No e/o cellulitis, nec fasc, limb ischemia, compartment syndrome. No electrolyte abnormalities. Mild leukocytosis may be 2/2 steroids. Given decadron, Toradol, fluids with resolution of symptoms. Patient is well appearing, NAD, afebrile, hemodynamically stable. Any available tests and studies were discussed with patient and family. Discharged with instructions in further symptomatic care, return precautions, and need for neuro f/u which she already has scheduled.

## 2022-01-07 NOTE — ED PROVIDER NOTE - CARE PROVIDER_API CALL
Kunal Power)  Neurology  [address] [place] [address], [address]  [place] [address]  Phone: [phone]  Fax: [phone]  Scheduled Appointment: 01/13/2022

## 2022-01-07 NOTE — ED PROVIDER NOTE - DISCHARGE DATE
Procedure(s):  RIGHT FEMORAL-POPLITEAL BYPASS WITH POLYTETRAFLUOROETHYLENE (PTFE). general    Anesthesia Post Evaluation        Patient location during evaluation: PACU  Patient participation: complete - patient participated  Level of consciousness: awake and alert  Pain management: adequate  Airway patency: patent  Anesthetic complications: no  Cardiovascular status: acceptable  Respiratory status: acceptable  Hydration status: acceptable  Comments: I have seen and evaluated the patient and is ready for discharge. Jomar Stanfodr MD    Post anesthesia nausea and vomiting:  none      Vitals Value Taken Time   /80 12/3/2019 10:00 AM   Temp 36.7 °C (98.1 °F) 12/3/2019  9:34 AM   Pulse 95 12/3/2019 10:04 AM   Resp 19 12/3/2019 10:04 AM   SpO2 100 % 12/3/2019 10:04 AM   Vitals shown include unvalidated device data.
07-Jan-2022

## 2022-01-07 NOTE — ED PROVIDER NOTE - OBJECTIVE STATEMENT
The pt is a 66y F w/ hx of MS presenting with BL LE pain from the knee down that started last night ~22:30. Pt took ibuprofen at @ 23:00 which relieved pain but then returned so pt came to ED. Has not felt this pain in the past. Pain is most the knees, but describes feeling like the veins in BL calf areas are "exploding." No injury. No recent traveling, long periods of immobility, swelling. States she recently had a URI, was treated with azithromycin and Decadron, finished steroids yesterday. Denies fever, headache, chest pain, SOB, N/V, abdominal pain.

## 2022-01-07 NOTE — ED PROVIDER NOTE - IV ALTEPLASE ADMIN OUTSIDE HIDDEN
Hi Dr. Pennington,  Any thoughts on these spots on Fortunato's forehead and behind his ear? Maybe eczema?  Thanks,  Jennifer Dalal [facility]
show

## 2022-01-07 NOTE — ED PROVIDER NOTE - PHYSICAL EXAMINATION
Vital Signs: I have reviewed the initial vital signs.  Constitutional: well-nourished, appears stated age, no acute distress  Cardiovascular: regular rate, regular rhythm, well-perfused extremities  Respiratory: unlabored respiratory effort, clear to auscultation bilaterally  Gastrointestinal: soft, non-tender abdomen  Musculoskeletal: supple neck, no lower extremity edema, (+) BL knee tenderness, full ROM, neurovascularly intact   Integumentary: warm, dry, no rash  Neurologic: awake, alert, extremities’ motor and sensory functions grossly intact  Psychiatric: appropriate mood, appropriate affect

## 2022-01-07 NOTE — ED PROVIDER NOTE - NS ED ROS FT
Constitutional: (-) fever  Eyes/ENT: (-) blurry vision, (-) epistaxis  Cardiovascular: (-) chest pain, (-) syncope  Respiratory: (-) cough, (-) shortness of breath  Gastrointestinal: (-) vomiting, (-) diarrhea  Musculoskeletal: (-) neck pain, (-) back pain, (+) BL LE pain   Integumentary: (-) rash, (-) edema  Neurological: (-) headache, (-) altered mental status  Psychiatric: (-) hallucinations  Allergic/Immunologic: (-) pruritus

## 2022-01-13 ENCOUNTER — APPOINTMENT (OUTPATIENT)
Dept: GERIATRICS | Facility: CLINIC | Age: 67
End: 2022-01-13

## 2022-03-23 ENCOUNTER — APPOINTMENT (OUTPATIENT)
Dept: NEUROLOGY | Facility: CLINIC | Age: 67
End: 2022-03-23
Payer: MEDICARE

## 2022-03-23 PROCEDURE — 64612 DESTROY NERVE FACE MUSCLE: CPT

## 2022-03-23 RX ADMIN — ONABOTULINUMTOXINA 0 UNIT: 100 INJECTION, POWDER, LYOPHILIZED, FOR SOLUTION INTRADERMAL; INTRAMUSCULAR at 00:00

## 2022-03-23 NOTE — PROCEDURE
[FreeTextEntry1] : Botox injection [FreeTextEntry2] : Blepharospasm [FreeTextEntry4] : none [FreeTextEntry3] : Procedure Performed: Botox injection. \par Indications: Blepharospasm right eye. \par Anesthesia: none. \par Procedure Note: Anesthesia: none. \par Procedure Note: : Dr. Power\par Operation: Procedure explained. Risk of bleeding infection, pain, swallow difficulty, facial droop, eyelid droop, neck weakness, generalized weakness explained. Consent obtained. Next a 100 unit vial of Onabotulinum toxin type A, Lot # [id number], exp. date 05/2024 was mixed with 4 cc's of normal saline for a dilution of 2.5 units per 0.1 ml. Next injections were performed as follows using a 30 gauge 1/2" needle:\par \par Right orbicularis oculis inferomedial: 3.75 units divided over three locations. Right orbicularis oculis inferolateral: 3.75 units divided over three locations. Right orbicularis oculis superomedial: 1.25 units. Right orbicularis oculis superolateral: 1.25 units. Patient experienced increased right facial droop after a previous Botox injections so declined zygomaticus nasalis, and orbicularis oris injections again today. \par \par Total botox injected = 10 units\par Total botox discarded = 90 units\par \par Complications: none. \par \par Patient reports Botox effect within a day and it lasting only a month and a half.  Aside from the short duration she is satisfied with the relief and did not want to change anything.\par

## 2022-03-24 RX ORDER — ONABOTULINUMTOXINA 100 [USP'U]/1
100 INJECTION, POWDER, LYOPHILIZED, FOR SOLUTION INTRADERMAL; INTRAMUSCULAR
Qty: 1 | Refills: 0 | Status: COMPLETED | OUTPATIENT
Start: 2022-03-23

## 2022-07-08 ENCOUNTER — APPOINTMENT (OUTPATIENT)
Dept: NEUROLOGY | Facility: CLINIC | Age: 67
End: 2022-07-08

## 2022-07-21 ENCOUNTER — APPOINTMENT (OUTPATIENT)
Dept: NEUROLOGY | Facility: CLINIC | Age: 67
End: 2022-07-21

## 2022-09-30 ENCOUNTER — NON-APPOINTMENT (OUTPATIENT)
Age: 67
End: 2022-09-30

## 2022-09-30 ENCOUNTER — APPOINTMENT (OUTPATIENT)
Dept: NEUROLOGY | Facility: CLINIC | Age: 67
End: 2022-09-30

## 2022-09-30 DIAGNOSIS — G24.5 BLEPHAROSPASM: ICD-10-CM

## 2022-09-30 PROCEDURE — 64612 DESTROY NERVE FACE MUSCLE: CPT

## 2022-09-30 RX ADMIN — ONABOTULINUMTOXINA 0 UNIT: 100 INJECTION, POWDER, LYOPHILIZED, FOR SOLUTION INTRADERMAL; INTRAMUSCULAR at 00:00

## 2022-09-30 NOTE — PROCEDURE
[FreeTextEntry1] : Botox injection [FreeTextEntry2] : Blepharospasm [FreeTextEntry4] : none [FreeTextEntry3] : Procedure Performed: Botox injection. \par Indications: Blepharospasm right eye. \par Anesthesia: none. \par Procedure Note: Anesthesia: none. \par Procedure Note: : Dr. Power\par Operation: Procedure explained. Risk of bleeding infection, pain, swallow difficulty, facial droop, eyelid droop, neck weakness, generalized weakness explained. Consent obtained. Next a 100 unit vial of Onabotulinum toxin type A, Lot # [id number], exp. date 08/2024 was mixed with 4 cc's of normal saline for a dilution of 2.5 units per 0.1 ml. Next injections were performed as follows using a 30 gauge 1/2" needle:\par \par Right orbicularis oculis inferomedial: 3.75 units divided over three locations. Right orbicularis oculis inferolateral: 3.75 units divided over three locations. Right orbicularis oculis superomedial: 2.5 units divided over two locations. Right orbicularis oculis superolateral: 2.5 units divided over two locations. Patient experienced increased right facial droop after a previous Botox injection in 2019 so continues to decline zygomaticus nasalis, and orbicularis oris injections.  She requested more Botox be given because the blepharospasm improved from 10 to a 7.  Consequently the superior and inferior lateral orbicularis were increased from 1.25 units each to 2.5 units each with plan to increase to 3.75 units each next visit if needed.\par \par Total botox injected = 12.5 units\par Total botox discarded = 87.5 units\par \par Complications: subcutaneous bleeding lower lid, treated with pressure and ice. \par

## 2023-01-21 ENCOUNTER — INPATIENT (INPATIENT)
Facility: HOSPITAL | Age: 68
LOS: 2 days | Discharge: HOME | End: 2023-01-24
Attending: HOSPITALIST | Admitting: HOSPITALIST
Payer: MEDICARE

## 2023-01-21 VITALS
TEMPERATURE: 97 F | HEART RATE: 94 BPM | DIASTOLIC BLOOD PRESSURE: 106 MMHG | SYSTOLIC BLOOD PRESSURE: 161 MMHG | OXYGEN SATURATION: 97 % | RESPIRATION RATE: 19 BRPM

## 2023-01-21 DIAGNOSIS — Z98.890 OTHER SPECIFIED POSTPROCEDURAL STATES: Chronic | ICD-10-CM

## 2023-01-21 DIAGNOSIS — Z90.710 ACQUIRED ABSENCE OF BOTH CERVIX AND UTERUS: Chronic | ICD-10-CM

## 2023-01-21 DIAGNOSIS — Z98.82 BREAST IMPLANT STATUS: Chronic | ICD-10-CM

## 2023-01-21 LAB
ALBUMIN SERPL ELPH-MCNC: 4.4 G/DL — SIGNIFICANT CHANGE UP (ref 3.5–5.2)
ALP SERPL-CCNC: 69 U/L — SIGNIFICANT CHANGE UP (ref 30–115)
ALT FLD-CCNC: 40 U/L — SIGNIFICANT CHANGE UP (ref 0–41)
ANION GAP SERPL CALC-SCNC: 15 MMOL/L — HIGH (ref 7–14)
AST SERPL-CCNC: 39 U/L — SIGNIFICANT CHANGE UP (ref 0–41)
BASOPHILS # BLD AUTO: 0.08 K/UL — SIGNIFICANT CHANGE UP (ref 0–0.2)
BASOPHILS NFR BLD AUTO: 0.8 % — SIGNIFICANT CHANGE UP (ref 0–1)
BILIRUB SERPL-MCNC: <0.2 MG/DL — SIGNIFICANT CHANGE UP (ref 0.2–1.2)
BUN SERPL-MCNC: 14 MG/DL — SIGNIFICANT CHANGE UP (ref 10–20)
CALCIUM SERPL-MCNC: 10.1 MG/DL — SIGNIFICANT CHANGE UP (ref 8.4–10.5)
CHLORIDE SERPL-SCNC: 101 MMOL/L — SIGNIFICANT CHANGE UP (ref 98–110)
CO2 SERPL-SCNC: 23 MMOL/L — SIGNIFICANT CHANGE UP (ref 17–32)
CREAT SERPL-MCNC: 0.8 MG/DL — SIGNIFICANT CHANGE UP (ref 0.7–1.5)
EGFR: 81 ML/MIN/1.73M2 — SIGNIFICANT CHANGE UP
EOSINOPHIL # BLD AUTO: 0.23 K/UL — SIGNIFICANT CHANGE UP (ref 0–0.7)
EOSINOPHIL NFR BLD AUTO: 2.4 % — SIGNIFICANT CHANGE UP (ref 0–8)
GLUCOSE SERPL-MCNC: 83 MG/DL — SIGNIFICANT CHANGE UP (ref 70–99)
HCT VFR BLD CALC: 43.8 % — SIGNIFICANT CHANGE UP (ref 37–47)
HGB BLD-MCNC: 14.8 G/DL — SIGNIFICANT CHANGE UP (ref 12–16)
IMM GRANULOCYTES NFR BLD AUTO: 0.3 % — SIGNIFICANT CHANGE UP (ref 0.1–0.3)
LACTATE SERPL-SCNC: 1.7 MMOL/L — SIGNIFICANT CHANGE UP (ref 0.7–2)
LYMPHOCYTES # BLD AUTO: 4.1 K/UL — HIGH (ref 1.2–3.4)
LYMPHOCYTES # BLD AUTO: 42.3 % — SIGNIFICANT CHANGE UP (ref 20.5–51.1)
MAGNESIUM SERPL-MCNC: 2 MG/DL — SIGNIFICANT CHANGE UP (ref 1.8–2.4)
MCHC RBC-ENTMCNC: 33.5 PG — HIGH (ref 27–31)
MCHC RBC-ENTMCNC: 33.8 G/DL — SIGNIFICANT CHANGE UP (ref 32–37)
MCV RBC AUTO: 99.1 FL — HIGH (ref 81–99)
MONOCYTES # BLD AUTO: 1.04 K/UL — HIGH (ref 0.1–0.6)
MONOCYTES NFR BLD AUTO: 10.7 % — HIGH (ref 1.7–9.3)
NEUTROPHILS # BLD AUTO: 4.21 K/UL — SIGNIFICANT CHANGE UP (ref 1.4–6.5)
NEUTROPHILS NFR BLD AUTO: 43.5 % — SIGNIFICANT CHANGE UP (ref 42.2–75.2)
NRBC # BLD: 0 /100 WBCS — SIGNIFICANT CHANGE UP (ref 0–0)
PLATELET # BLD AUTO: 238 K/UL — SIGNIFICANT CHANGE UP (ref 130–400)
POTASSIUM SERPL-MCNC: 5.1 MMOL/L — HIGH (ref 3.5–5)
POTASSIUM SERPL-SCNC: 5.1 MMOL/L — HIGH (ref 3.5–5)
PROT SERPL-MCNC: 6.9 G/DL — SIGNIFICANT CHANGE UP (ref 6–8)
RBC # BLD: 4.42 M/UL — SIGNIFICANT CHANGE UP (ref 4.2–5.4)
RBC # FLD: 12.9 % — SIGNIFICANT CHANGE UP (ref 11.5–14.5)
SARS-COV-2 RNA SPEC QL NAA+PROBE: SIGNIFICANT CHANGE UP
SODIUM SERPL-SCNC: 139 MMOL/L — SIGNIFICANT CHANGE UP (ref 135–146)
WBC # BLD: 9.69 K/UL — SIGNIFICANT CHANGE UP (ref 4.8–10.8)
WBC # FLD AUTO: 9.69 K/UL — SIGNIFICANT CHANGE UP (ref 4.8–10.8)

## 2023-01-21 PROCEDURE — 93010 ELECTROCARDIOGRAM REPORT: CPT

## 2023-01-21 PROCEDURE — 99223 1ST HOSP IP/OBS HIGH 75: CPT

## 2023-01-21 PROCEDURE — 99285 EMERGENCY DEPT VISIT HI MDM: CPT

## 2023-01-21 PROCEDURE — 70450 CT HEAD/BRAIN W/O DYE: CPT | Mod: 26,MA

## 2023-01-21 PROCEDURE — 99283 EMERGENCY DEPT VISIT LOW MDM: CPT

## 2023-01-21 RX ORDER — ATORVASTATIN CALCIUM 80 MG/1
0.5 TABLET, FILM COATED ORAL
Qty: 0 | Refills: 0 | DISCHARGE

## 2023-01-21 RX ORDER — LANOLIN ALCOHOL/MO/W.PET/CERES
3 CREAM (GRAM) TOPICAL AT BEDTIME
Refills: 0 | Status: DISCONTINUED | OUTPATIENT
Start: 2023-01-21 | End: 2023-01-24

## 2023-01-21 RX ORDER — ACETAMINOPHEN 500 MG
975 TABLET ORAL ONCE
Refills: 0 | Status: COMPLETED | OUTPATIENT
Start: 2023-01-21 | End: 2023-01-21

## 2023-01-21 RX ORDER — ACETAMINOPHEN 500 MG
650 TABLET ORAL EVERY 6 HOURS
Refills: 0 | Status: DISCONTINUED | OUTPATIENT
Start: 2023-01-21 | End: 2023-01-24

## 2023-01-21 RX ORDER — ONDANSETRON 8 MG/1
4 TABLET, FILM COATED ORAL EVERY 8 HOURS
Refills: 0 | Status: DISCONTINUED | OUTPATIENT
Start: 2023-01-21 | End: 2023-01-24

## 2023-01-21 NOTE — ED ADULT NURSE NOTE - NSHOSCREENINGQ1_ED_ALL_ED
Chief complaint:   Chief Complaint   Patient presents with   • Follow-up       Vitals:  Visit Vitals  /64   Ht 5' 11\" (1.803 m)   Wt 98 kg   BMI 30.13 kg/m²       HISTORY OF PRESENT ILLNESS     HPI here for 6 months follow-up of her diabetes. Patient is quite hard of hearing has bilateral hearing aids.  Difficult to assess whether she just doesn't hear me whether she's having cognitive issues.  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  Doesn't really check her blood sugar that often but does look at her feet    Other significant problems:  Patient Active Problem List    Diagnosis Date Noted   • Diabetes mellitus (CMS/[facility]) 02/27/2013     Priority: Medium   • Cognitive change 07/31/2017     Priority: Low   • Bilateral hearing loss 03/15/2016     Priority: Low     Hearing aids bilateral     • Arthritis of wrist, left, degenerative 10/28/2013     Priority: Low   • Swelling of joint, wrist, left 09/19/2013     Priority: Low   • HLD (hyperlipidemia) 08/27/2013     Priority: Low   • Lesion of ulnar nerve 08/19/2013     Priority: Low   • Carpal tunnel syndrome 08/19/2013     Priority: Low   • Pain in limb 08/19/2013     Priority: Low   • Tendinitis of right wrist, ECU 08/19/2013     Priority: Low   • Other and unspecified hyperlipidemia 04/23/2012     Priority: Low   • Obesity, unspecified 04/23/2012     Priority: Low   • Menopausal and postmenopausal disorder 04/23/2012     Priority: Low       PAST MEDICAL, FAMILY AND SOCIAL HISTORY     Medications:  Current Outpatient Prescriptions   Medication   • glimepiride (AMARYL) 1 MG tablet   • simvastatin (ZOCOR) 40 MG tablet   • metformin (GLUCOPHAGE) 1000 MG tablet   • ACCU-CHEK RAJIV PLUS test strip   • Lancets (ACCU-CHEK MULTICLIX) Misc   • Calcium 600 MG tablet   • fish oil-omega-3 fatty acids 1000 MG CAPS   • diphenhydrAMINE (BENADRYL) 50 MG capsule   •  Multiple Vitamins-Minerals (EYE VITAMINS) CAPS   • Cholecalciferol 1000 UNITS tablet   • cyanocobalamin 1000 MCG tablet   • clotrimazole-betamethasone (LOTRISONE) 1-0.05 % cream     No current facility-administered medications for this visit.        Allergies:  ALLERGIES:   Allergen Reactions   • Clindamycin        Past Medical  History/Surgeries:  Past Medical History:   Diagnosis Date   • Allergy    • Cataract    • Diabetes mellitus, type 2 (CMS/HCC)    • Disorder of bone and cartilage, unspecified 03/08/2010       Past Surgical History:   Procedure Laterality Date   • DEXA BONE DENSITY AXIAL SKELETON  10/09/2009   • EYE SURGERY         Family History:  Family History   Problem Relation Age of Onset   • Adopted: Yes   • Family history unknown: Yes       Social History:  Social History   Substance Use Topics   • Smoking status: Former Smoker     Quit date: 8/24/2010   • Smokeless tobacco: Never Used   • Alcohol use No       REVIEW OF SYSTEMS     Review of Systems   Constitutional: Negative.    HENT: Negative.    Eyes: Negative.    Respiratory: Negative.    Cardiovascular: Negative.    Gastrointestinal: Negative.    Endocrine: Negative for polydipsia, polyphagia and polyuria.   Genitourinary: Negative.    Musculoskeletal: Negative.    All other systems reviewed and are negative.      PHYSICAL EXAM     Physical Exam   Constitutional: She appears well-developed and well-nourished.   HENT:   Head: Normocephalic and atraumatic.   Right Ear: External ear normal.   Left Ear: External ear normal.   Rather hard of hearing even with her hearing aids in   Eyes: Conjunctivae are normal. Pupils are equal, round, and reactive to light.   Neck: Normal range of motion. Neck supple. No JVD present. No tracheal deviation present. No thyromegaly present.   Cardiovascular: Normal rate and normal heart sounds.    Normal rate, normal rhythm. No murmurs, no gallops, no rubs.   Pulmonary/Chest:   Normal breath sounds. No respiratory  distress. No wheezing. No rales. No rhonchi. No chest tenderness.Breasts are symmetric. No breast masses. No nipple discharge.   Abdominal: Soft. Bowel sounds are normal. She exhibits no distension. There is no tenderness. There is no rebound.   Musculoskeletal: Normal range of motion. She exhibits no edema.   Lymphadenopathy:     She has no cervical adenopathy.   Neurological:   Difficult to assess due to hearing loss for the presence of any cognitive decline. Patient had episode last year of becoming lost while visiting in [place]   Psychiatric: She has a normal mood and affect.   Nursing note and vitals reviewed.      ASSESSMENT/PLAN     I've asked her to obtain an A1c today  I did have a look at her feet and they look fine  We did discuss routine health maintenance in vaccines and she is somewhat holding off on that  I've asked her if we should have her seen by neurology because of her continuing issues with her memory, she like to hold off on that at this time as well  I don't think she's dangerous to herself or to others and as said before she is quite hard of hearing which magnifies the issue   No

## 2023-01-21 NOTE — ED ADULT NURSE NOTE - OBJECTIVE STATEMENT
Patient states she had an episode of tremors lasting a few seconds this afternoon, patient described it as being electrocuted, denies fever, chills, nausea, vomiting, SOB, CP, visual changes. Patient has PMH of MS but has been off her medication for 7 years. GCS-15 PEERL, neuro exam intact.

## 2023-01-21 NOTE — ED PROVIDER NOTE - ATTENDING APP SHARED VISIT CONTRIBUTION OF CARE
64-year-old female PMH MS currently not undergoing any treatment, recent diagnosis of COVID, but  negative today presenting for evaluation of an episode of generalized upper body shaking earlier today.  Patient states that she had a normal morning, all of a sudden developed generalized shaking of her arms and head while sitting at a table, did not lose consciousness, but could not stop the involuntary shaking.  This lasted about 30 seconds, this was observed by her .  No postictal period, no tongue biting or incontinence.  Patient denies any associated headache, neck pain, double vision, chest pain or palpitations, shortness of breath, focal weakness or any other additional complaints.  Denies any similar symptoms in the past.  Well-appearing well-nourished, NAD, head AT/NC, PERRL, pink conjunctivae,  mmm,, supple neck , nml work of breathing, lungs CTA b/l, equal air entry, RRR, well-perfused extremities, distal pulses intact, abdomen soft, NT/ND, BS present in all quadrants, no midline spine or CVA ttp, no leg edema or unilateral calf swelling, A&Ox3, no gross neuro deficits, nml mood and affect.  Plan: Annual imaging, labs, neurology consult, reassess.

## 2023-01-21 NOTE — H&P ADULT - HISTORY OF PRESENT ILLNESS
68 YO F w/ a PMH of HLD and MS who presents to the hospital w/ a c/o whole body "twitching" that lasted for 30 seconds PTA. Described as tonic/clonic shaking. - LOC,  - bowel/bladder incontinence, - tongue laceration, - post-ictal confusion. Denies any preceding symptoms. ROS is negative except as above.     Of note, the pt has not been on her MS meds for many years.     In the ED, CTH showed no acute process. Neuro consulted and asked for transfer to [facility] EMU.     FMHx:  -No family hx of seizures, epilepsy, or CAD.     SHX:  -Smokes 1/2 PPD for 40 years  -Denies any drug or EtOH abuse    Physical exam shows pt in NAD. VSS, afebrile, not hypoxic on RA. A&Ox3. Neuro exam without deficits, motor/sensory intact, no dysarthria, no facial asymmetry. Muscle strength/sensation intact. CTA B/L with no W/C/R. RRR, no M/G/R. ABD is soft and non-tender, normoactive BSs. LEs without swelling. No rashes. Labs and radiology as above.    Generalized shaking, rule out seizure. Neurology is following. Ativan PRN. Transfer to [facility] EMU. Restart home meds. Seizure precautions.    Hx of HLD. Restart home meds, except as stated above. DVT PPX. Inform PCP of pt's admission to hospital. My note supersedes the residents note.     Spoke with family: , at bedside    Date seen by [email or web address]   68 YO F w/ a PMH of HLD and MS who presents to the hospital w/ a c/o whole body "twitching" that lasted for 30 seconds PTA. Described as tonic/clonic shaking. - LOC,  - bowel/bladder incontinence, - tongue laceration, - post-ictal confusion. Denies any preceding symptoms. ROS is negative except as above.     Of note, the pt has not been on her MS meds for many years.     In the ED, CTH showed no acute process. Neuro consulted and asked for transfer to [facility] EMU.     FMHx:  -No family hx of seizures, epilepsy, or CAD.     SHX:  -Smokes 1/2 PPD for 40 years  -Denies any drug or EtOH abuse    Physical exam shows pt in NAD. VSS, afebrile, not hypoxic on RA. A&Ox3. Neuro exam without deficits, motor/sensory intact, no dysarthria, no facial asymmetry. Muscle strength/sensation intact. CTA B/L with no W/C/R. RRR, no M/G/R. ABD is soft and non-tender, normoactive BSs. LEs without swelling. No rashes. Labs and radiology as above.    Generalized shaking, rule out seizure. Neurology is following. Ativan PRN. Transfer to [facility] EMU. Restart home meds. Seizure precautions.  -Accepted by Dr. Glover at [facility] at 2214    Hx of HLD. Restart home meds, except as stated above. DVT PPX. Inform PCP of pt's admission to hospital. My note supersedes the residents note.     Spoke with family: , at bedside    Date seen by [email or web address]

## 2023-01-21 NOTE — ED PROVIDER NOTE - OBJECTIVE STATEMENT
67-year-old female with past medical history of multiple sclerosis presented for evaluation of a seizure-like activity that occurred today about an hour ago.  Patient states that she was sitting in her counter when her upper body started shaking.  Patient states that she was awake.  Symptoms lasted about 20 seconds.  Episode was witnessed by her .  Reports feeling foggy.  No headache, dizziness, visual changes, nausea, vomiting, chest pain or shortness of breath.  No paresthesias or weakness

## 2023-01-21 NOTE — ED ADULT TRIAGE NOTE - CHIEF COMPLAINT QUOTE
"about an hour ago I started twitching my whole body but I was awake and conscious the whole time couple seconds. I thought my i-watch electrocuted me "

## 2023-01-21 NOTE — ED PROVIDER NOTE - CLINICAL SUMMARY MEDICAL DECISION MAKING FREE TEXT BOX
67-year-old female with history of MS presenting for evaluation of generalized shaking.  CT head and labs ordered and reviewed by me, management discussed with neurology service, patient to be admitted to epilepsy unit for further treatment and testing, additional history provided by patient's .

## 2023-01-21 NOTE — CONSULT NOTE ADULT - ASSESSMENT
Impression;  67 year old woman with a PMH of multiple sclerosis not on disease modifying drugs presents to the ED with 30 seconds of witnessed generalized shaking while she was awake. Denies urinary or fecal incontinence or tongue trauma. Describes an electrocuting feeling. CTh without acute intracranial pathology.   Patient back to baseline. Patient with COVID three weeks ago. Now at baseline without any MS flare symptoms. Etiology of symptoms unknown, epileptiform cause should be ruled out.     Suggestion:  If Covid negative, [facility] admission for 24 hour VEEG.   Seizure precautions  Keep magnesium >2  No AED for now.

## 2023-01-21 NOTE — ED PROVIDER NOTE - PROGRESS NOTE DETAILS
As per neurology ILDA Holcomb patient can be admitted to epilepsy unit at [facility]. Endorsed case to Dr. David

## 2023-01-22 LAB
ALBUMIN SERPL ELPH-MCNC: 3.8 G/DL — SIGNIFICANT CHANGE UP (ref 3.5–5.2)
ALP SERPL-CCNC: 63 U/L — SIGNIFICANT CHANGE UP (ref 30–115)
ALT FLD-CCNC: 32 U/L — SIGNIFICANT CHANGE UP (ref 0–41)
ANION GAP SERPL CALC-SCNC: 9 MMOL/L — SIGNIFICANT CHANGE UP (ref 7–14)
AST SERPL-CCNC: 24 U/L — SIGNIFICANT CHANGE UP (ref 0–41)
BASOPHILS # BLD AUTO: 0.08 K/UL — SIGNIFICANT CHANGE UP (ref 0–0.2)
BASOPHILS NFR BLD AUTO: 1.2 % — HIGH (ref 0–1)
BILIRUB SERPL-MCNC: 0.4 MG/DL — SIGNIFICANT CHANGE UP (ref 0.2–1.2)
BUN SERPL-MCNC: 13 MG/DL — SIGNIFICANT CHANGE UP (ref 10–20)
CALCIUM SERPL-MCNC: 9.4 MG/DL — SIGNIFICANT CHANGE UP (ref 8.4–10.5)
CHLORIDE SERPL-SCNC: 105 MMOL/L — SIGNIFICANT CHANGE UP (ref 98–110)
CHOLEST SERPL-MCNC: 224 MG/DL — HIGH
CO2 SERPL-SCNC: 26 MMOL/L — SIGNIFICANT CHANGE UP (ref 17–32)
CREAT SERPL-MCNC: 0.7 MG/DL — SIGNIFICANT CHANGE UP (ref 0.7–1.5)
EGFR: 95 ML/MIN/1.73M2 — SIGNIFICANT CHANGE UP
EOSINOPHIL # BLD AUTO: 0.31 K/UL — SIGNIFICANT CHANGE UP (ref 0–0.7)
EOSINOPHIL NFR BLD AUTO: 4.6 % — SIGNIFICANT CHANGE UP (ref 0–8)
GLUCOSE SERPL-MCNC: 92 MG/DL — SIGNIFICANT CHANGE UP (ref 70–99)
HCT VFR BLD CALC: 41.4 % — SIGNIFICANT CHANGE UP (ref 37–47)
HDLC SERPL-MCNC: 60 MG/DL — SIGNIFICANT CHANGE UP
HGB BLD-MCNC: 14 G/DL — SIGNIFICANT CHANGE UP (ref 12–16)
IMM GRANULOCYTES NFR BLD AUTO: 0.3 % — SIGNIFICANT CHANGE UP (ref 0.1–0.3)
LIPID PNL WITH DIRECT LDL SERPL: 143 MG/DL — HIGH
LYMPHOCYTES # BLD AUTO: 2.65 K/UL — SIGNIFICANT CHANGE UP (ref 1.2–3.4)
LYMPHOCYTES # BLD AUTO: 39.7 % — SIGNIFICANT CHANGE UP (ref 20.5–51.1)
MCHC RBC-ENTMCNC: 33.4 PG — HIGH (ref 27–31)
MCHC RBC-ENTMCNC: 33.8 G/DL — SIGNIFICANT CHANGE UP (ref 32–37)
MCV RBC AUTO: 98.8 FL — SIGNIFICANT CHANGE UP (ref 81–99)
MONOCYTES # BLD AUTO: 0.79 K/UL — HIGH (ref 0.1–0.6)
MONOCYTES NFR BLD AUTO: 11.8 % — HIGH (ref 1.7–9.3)
NEUTROPHILS # BLD AUTO: 2.83 K/UL — SIGNIFICANT CHANGE UP (ref 1.4–6.5)
NEUTROPHILS NFR BLD AUTO: 42.4 % — SIGNIFICANT CHANGE UP (ref 42.2–75.2)
NON HDL CHOLESTEROL: 164 MG/DL — HIGH
NRBC # BLD: 0 /100 WBCS — SIGNIFICANT CHANGE UP (ref 0–0)
PLATELET # BLD AUTO: 230 K/UL — SIGNIFICANT CHANGE UP (ref 130–400)
POTASSIUM SERPL-MCNC: 4.5 MMOL/L — SIGNIFICANT CHANGE UP (ref 3.5–5)
POTASSIUM SERPL-SCNC: 4.5 MMOL/L — SIGNIFICANT CHANGE UP (ref 3.5–5)
PROT SERPL-MCNC: 5.9 G/DL — LOW (ref 6–8)
RBC # BLD: 4.19 M/UL — LOW (ref 4.2–5.4)
RBC # FLD: 13 % — SIGNIFICANT CHANGE UP (ref 11.5–14.5)
SODIUM SERPL-SCNC: 140 MMOL/L — SIGNIFICANT CHANGE UP (ref 135–146)
TRIGL SERPL-MCNC: 106 MG/DL — SIGNIFICANT CHANGE UP
WBC # BLD: 6.68 K/UL — SIGNIFICANT CHANGE UP (ref 4.8–10.8)
WBC # FLD AUTO: 6.68 K/UL — SIGNIFICANT CHANGE UP (ref 4.8–10.8)

## 2023-01-22 PROCEDURE — 99232 SBSQ HOSP IP/OBS MODERATE 35: CPT

## 2023-01-22 PROCEDURE — 99222 1ST HOSP IP/OBS MODERATE 55: CPT

## 2023-01-22 RX ORDER — POLYETHYLENE GLYCOL 3350 17 G/17G
17 POWDER, FOR SOLUTION ORAL ONCE
Refills: 0 | Status: COMPLETED | OUTPATIENT
Start: 2023-01-22 | End: 2023-01-22

## 2023-01-22 RX ADMIN — Medication 650 MILLIGRAM(S): at 06:29

## 2023-01-22 RX ADMIN — POLYETHYLENE GLYCOL 3350 17 GRAM(S): 17 POWDER, FOR SOLUTION ORAL at 19:07

## 2023-01-22 NOTE — CONSULT NOTE ADULT - SUBJECTIVE AND OBJECTIVE BOX
Neurology Consult    Patient is a 67y old  Female who presents with a chief complaint of Seizure like activity (2023 10:42)      HPI:  66 YO F w/ a PMH of HLD and MS who presents to the hospital w/ a c/o whole body "twitching" that lasted for 30 seconds PTA. Described as tonic/clonic shaking. - LOC,  - bowel/bladder incontinence, - tongue laceration, - post-ictal confusion. Denies any preceding symptoms. ROS is negative except as above.     Of note, the pt has not been on her MS meds for many years.     In the ED, CTH showed no acute process. Neuro consulted and asked for transfer to [facility] EMU.     FMHx:  -No family hx of seizures, epilepsy, or CAD.     SHX:  -Smokes 1/2 PPD for 40 years  -Denies any drug or EtOH abuse    Physical exam shows pt in NAD. VSS, afebrile, not hypoxic on RA. A&Ox3. Neuro exam without deficits, motor/sensory intact, no dysarthria, no facial asymmetry. Muscle strength/sensation intact. CTA B/L with no W/C/R. RRR, no M/G/R. ABD is soft and non-tender, normoactive BSs. LEs without swelling. No rashes. Labs and radiology as above.    Generalized shaking, rule out seizure. Neurology is following. Ativan PRN. Transfer to [facility] EMU. Restart home meds. Seizure precautions.  -Accepted by Dr. Glover at [facility] at 2214    Hx of HLD. Restart home meds, except as stated above. DVT PPX. Inform PCP of pt's admission to hospital. My note supersedes the residents note.     Spoke with family: , at bedside    Date seen by [email or web address]   (2023 22:02)      PAST MEDICAL & SURGICAL HISTORY:  Multiple sclerosis      H/O: hysterectomy      H/O breast augmentation      History of facial surgery          FAMILY HISTORY:  Family history of stroke (Mother)        Social History: (-) x 3    Allergies    ampicillin (Other)    Intolerances        MEDICATIONS  (STANDING):    MEDICATIONS  (PRN):  acetaminophen     Tablet .. 650 milliGRAM(s) Oral every 6 hours PRN Temp greater or equal to 38C (100.4F), Mild Pain (1 - 3)  aluminum hydroxide/magnesium hydroxide/simethicone Suspension 30 milliLiter(s) Oral every 4 hours PRN Dyspepsia  LORazepam   Injectable 2 milliGRAM(s) IV Push every 2 hours PRN seizure  melatonin 3 milliGRAM(s) Oral at bedtime PRN Insomnia  ondansetron Injectable 4 milliGRAM(s) IV Push every 8 hours PRN Nausea and/or Vomiting      Review of systems:    Constitutional: as per HPI  Eyes: No eye pain or discharge  ENMT:  No difficulty hearing; No sinus or throat pain  Neck: No pain or stiffness  Respiratory: No cough, wheezing, chills or hemoptysis  Cardiovascular: No chest pain, palpitations, shortness of breath, dyspnea on exertion  Gastrointestinal: No abdominal pain, nausea, vomiting or hematemesis; No diarrhea or constipation.   Genitourinary: No dysuria, frequency, hematuria or incontinence  Neurological: As per HPI  Skin: No rashes or lesions   Endocrine: No heat or cold intolerance; No hair loss  Musculoskeletal: No joint pain or swelling  Psychiatric: No depression, anxiety, mood swings  Heme/Lymph: No easy bruising or bleeding gums    Vital Signs Last 24 Hrs  T(C): 35.9 (2023 06:37), Max: 36.1 (2023 16:39)  T(F): 96.7 (2023 06:37), Max: 97 (2023 16:39)  HR: 70 (2023 06:37) (70 - 94)  BP: 110/53 (2023 06:37) (110/53 - 161/106)  BP(mean): 99 (2023 22:21) (99 - 99)  RR: 18 (2023 06:37) (16 - 19)  SpO2: 97% (2023 16:39) (97% - 97%)    Parameters below as of 2023 16:39  Patient On (Oxygen Delivery Method): room air        Examination:  General:  Appearance is consistent with chronologic age.  No abnormal facies.  Gross skin survey within normal limits.    Cognitive/Language:  The patient is oriented to person, place, time and date.  Recent and remote memory intact.  Fund of knowledge is intact and normal.  Language with normal repetition, comprehension and naming.  Nondysarthric.    Eyes: intact VA, VFF.  EOMI w/o nystagmus, skew or reported double vision.  PERRL.  No ptosis/weakness of eyelid closure.    Face:  Facial sensation normal V1 - 3, no facial asymmetry.    Ears/Nose/Throat:  Hearing grossly intact b/l.  Palate elevates midline.  Tongue and uvula midline.   Motor examination:   Normal tone, bulk and range of motion.  No tenderness, twitching, tremors or involuntary movements.  Formal Muscle Strength Testing: (MRC grade R/L) 5/5 UE; 5/5 LE.  No observable drift.  Reflexes:   2+ b/l pectoralis, biceps, triceps, brachioradialis, patella and Achilles.  Plantar response downgoing b/l.  Jaw jerk, Saurav, clonus absent.  Sensory examination:   Intact to light touch and pinprick, pain, temperature and proprioception and vibration in all extremities.  Cerebellum:   FTN/HKS intact with normal MICHAELA in all limbs.  No dysmetria or dysdiadokinesia.  Gait narrow based and normal.    Respiratory:  no audible wheezing or inspiratory stridor.  no use of accessory muscles.   Cardiac: pulse palpable, no audible bruits  Abdomen: supple, no guarding, no TTP    Labs:   CBC Full  -  ( 2023 07:46 )  WBC Count : 6.68 K/uL  RBC Count : 4.19 M/uL  Hemoglobin : 14.0 g/dL  Hematocrit : 41.4 %  Platelet Count - Automated : 230 K/uL  Mean Cell Volume : 98.8 fL  Mean Cell Hemoglobin : 33.4 pg  Mean Cell Hemoglobin Concentration : 33.8 g/dL  Auto Neutrophil # : 2.83 K/uL  Auto Lymphocyte # : 2.65 K/uL  Auto Monocyte # : 0.79 K/uL  Auto Eosinophil # : 0.31 K/uL  Auto Basophil # : 0.08 K/uL  Auto Neutrophil % : 42.4 %  Auto Lymphocyte % : 39.7 %  Auto Monocyte % : 11.8 %  Auto Eosinophil % : 4.6 %  Auto Basophil % : 1.2 %    -    140  |  105  |  13  ----------------------------<  92  4.5   |  26  |  0.7    Ca    9.4      2023 07:46  Mg     2.0     -21    TPro  5.9<L>  /  Alb  3.8  /  TBili  0.4  /  DBili  x   /  AST  24  /  ALT  32  /  AlkPhos  63      LIVER FUNCTIONS - ( 2023 07:46 )  Alb: 3.8 g/dL / Pro: 5.9 g/dL / ALK PHOS: 63 U/L / ALT: 32 U/L / AST: 24 U/L / GGT: x                   Neuroimaging:  NCHCT: CT Head No Cont:   ACC: [id number] EXAM:  CT BRAIN   ORDERED BY: SHASTA IYER     PROCEDURE DATE:  2023          INTERPRETATION:  Clinical History / Reason for exam: Headache  The study was performed without IV contrast.  Comparison 2019  The cisterns andventricles are normal with no shift in midline   structures.  No hemorrhage, infarct or mass formation is seen.  The skull is intact.    IMPRESSION: No acute process seen    --- End of Report ---            ARON BRANDON MD; Attending Radiologist  This document has been electronically signed. 2023  6:08PM (23 @ 17:43)      23 @ 14:05      
Neurology Consult    Patient is a 67y old  Female who presents with a chief complaint of generalized shaking    HPI:  67 year old woman with a PMH of multiple sclerosis not on disease modifying drugs presents to the ED with 30 seconds of witnessed generalized shaking while she was awake. Denies urinary or fecal incontinence or tongue trauma. Describes an electrocuting feeling.     PAST MEDICAL & SURGICAL HISTORY:  Multiple sclerosis      H/O: hysterectomy      H/O breast augmentation      History of facial surgery          FAMILY HISTORY:  Family history of stroke (Mother)        Social History: (-) x 3    Allergies    ampicillin (Other)    Intolerances        MEDICATIONS  (STANDING):    MEDICATIONS  (PRN):    Vital Signs Last 24 Hrs  T(C): 36.1 (21 Jan 2023 16:39), Max: 36.1 (21 Jan 2023 16:39)  T(F): 97 (21 Jan 2023 16:39), Max: 97 (21 Jan 2023 16:39)  HR: 94 (21 Jan 2023 16:39) (94 - 94)  BP: 161/106 (21 Jan 2023 16:39) (161/106 - 161/106)  BP(mean): --  RR: 19 (21 Jan 2023 16:39) (19 - 19)  SpO2: 97% (21 Jan 2023 16:39) (97% - 97%)    Parameters below as of 21 Jan 2023 16:39  Patient On (Oxygen Delivery Method): room air        Examination:  General:  Appearance is consistent with chronologic age.  No abnormal facies.  Gross skin survey within normal limits.    Cognitive/Language:  The patient is oriented to person, place, time and date.  Recent and remote memory intact.  Fund of knowledge is intact and normal.  Language with normal repetition, comprehension and naming.  Nondysarthric.    Eyes: intact VA, VFF.  EOMI w/o nystagmus, skew or reported double vision.  PERRL.  No ptosis/weakness of eyelid closure.    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    Formal Muscle Strength Testing: (MRC grade R/L) 5/5 UE; 5/5 LE..  Sensory examination:   Intact to light touch in all extremities.  Cerebellum:   FTN/HKS intact with normal MICHAELA in all limbs.  No dysmetria or dysdiadokinesia.  Gait deferred      Labs:   CBC Full  -  ( 21 Jan 2023 18:37 )  WBC Count : 9.69 K/uL  RBC Count : 4.42 M/uL  Hemoglobin : 14.8 g/dL  Hematocrit : 43.8 %  Platelet Count - Automated : 238 K/uL  Mean Cell Volume : 99.1 fL  Mean Cell Hemoglobin : 33.5 pg  Mean Cell Hemoglobin Concentration : 33.8 g/dL  Auto Neutrophil # : 4.21 K/uL  Auto Lymphocyte # : 4.10 K/uL  Auto Monocyte # : 1.04 K/uL  Auto Eosinophil # : 0.23 K/uL  Auto Basophil # : 0.08 K/uL  Auto Neutrophil % : 43.5 %  Auto Lymphocyte % : 42.3 %  Auto Monocyte % : 10.7 %  Auto Eosinophil % : 2.4 %  Auto Basophil % : 0.8 %    01-21    139  |  101  |  14  ----------------------------<  83  5.1<H>   |  23  |  0.8    Ca    10.1      21 Jan 2023 18:37  Mg     2.0     01-21    TPro  6.9  /  Alb  4.4  /  TBili  <0.2  /  DBili  x   /  AST  39  /  ALT  40  /  AlkPhos  69  01-21    LIVER FUNCTIONS - ( 21 Jan 2023 18:37 )  Alb: 4.4 g/dL / Pro: 6.9 g/dL / ALK PHOS: 69 U/L / ALT: 40 U/L / AST: 39 U/L / GGT: x                   Neuroimaging:  NCHCT: CT Head No Cont:   ACC: [id number] EXAM:  CT BRAIN   ORDERED BY: SHASTA IYER     PROCEDURE DATE:  01/21/2023          INTERPRETATION:  Clinical History / Reason for exam: Headache  The study was performed without IV contrast.  Comparison 2/23/2019  The cisterns andventricles are normal with no shift in midline   structures.  No hemorrhage, infarct or mass formation is seen.  The skull is intact.    IMPRESSION: No acute process seen

## 2023-01-22 NOTE — PROGRESS NOTE ADULT - ASSESSMENT
Patient is 68 yo female with hx of MS presenting with       1. Seizure like activity  -Head CT scan shows no acute process  -EEG  -Neurology to follow up   -Seizure precautions      #Progress Note Handoff  Pending (specify):  as above   Family discussion:  plan of care was discussed with patient  in details.  all questions were answered.  seems to understand, and in agreement  Disposition:  home with service

## 2023-01-23 LAB
A1C WITH ESTIMATED AVERAGE GLUCOSE RESULT: 5.2 % — SIGNIFICANT CHANGE UP (ref 4–5.6)
ESTIMATED AVERAGE GLUCOSE: 103 MG/DL — SIGNIFICANT CHANGE UP (ref 68–114)
HCV AB S/CO SERPL IA: 0.04 COI — SIGNIFICANT CHANGE UP
HCV AB SERPL-IMP: SIGNIFICANT CHANGE UP

## 2023-01-23 PROCEDURE — 99231 SBSQ HOSP IP/OBS SF/LOW 25: CPT

## 2023-01-23 PROCEDURE — 95720 EEG PHY/QHP EA INCR W/VEEG: CPT

## 2023-01-23 PROCEDURE — 99232 SBSQ HOSP IP/OBS MODERATE 35: CPT

## 2023-01-23 RX ADMIN — Medication 650 MILLIGRAM(S): at 18:55

## 2023-01-23 RX ADMIN — Medication 650 MILLIGRAM(S): at 01:06

## 2023-01-23 RX ADMIN — Medication 650 MILLIGRAM(S): at 00:36

## 2023-01-23 NOTE — PROGRESS NOTE ADULT - ASSESSMENT
Patient is 68 yo female with hx of MS presenting with       1. Seizure like activity  -Head CT scan shows no acute process  -VEEG  -Neurology to follow up   -Seizure precautions      #Progress Note Handoff  Pending (specify):  as above   Family discussion:  plan of care was discussed with patient  in details.  all questions were answered.  seems to understand, and in agreement  Disposition:  home with service

## 2023-01-24 ENCOUNTER — TRANSCRIPTION ENCOUNTER (OUTPATIENT)
Age: 68
End: 2023-01-24

## 2023-01-24 VITALS
HEART RATE: 65 BPM | RESPIRATION RATE: 16 BRPM | TEMPERATURE: 97 F | SYSTOLIC BLOOD PRESSURE: 124 MMHG | OXYGEN SATURATION: 97 % | DIASTOLIC BLOOD PRESSURE: 60 MMHG

## 2023-01-24 PROCEDURE — 95720 EEG PHY/QHP EA INCR W/VEEG: CPT

## 2023-01-24 PROCEDURE — 99231 SBSQ HOSP IP/OBS SF/LOW 25: CPT

## 2023-01-24 PROCEDURE — 99239 HOSP IP/OBS DSCHRG MGMT >30: CPT

## 2023-01-24 NOTE — DISCHARGE NOTE NURSING/CASE MANAGEMENT/SOCIAL WORK - NSDCPEFALRISK_GEN_ALL_CORE
For information on Fall & Injury Prevention, visit: [email or web address] OR  [email or web address] OR  https://www.cdc.gov/steadi/patient.html

## 2023-01-24 NOTE — DISCHARGE NOTE PROVIDER - PROVIDER TOKENS
PROVIDER:[TOKEN:[[id number]],FOLLOWUP:[1 week]],PROVIDER:[TOKEN:[[id number]],FOLLOWUP:[1 week]]

## 2023-01-24 NOTE — DISCHARGE NOTE NURSING/CASE MANAGEMENT/SOCIAL WORK - PATIENT PORTAL LINK FT
You can access the FollowMyHealth Patient Portal offered by [facility] by registering at the following website: [email or web address]. By joining [employer]’s FollowMyHealth portal, you will also be able to view your health information using other applications (apps) compatible with our system.

## 2023-01-24 NOTE — DISCHARGE NOTE NURSING/CASE MANAGEMENT/SOCIAL WORK - NSDCVIVACCINE_GEN_ALL_CORE_FT
Tdap; 15-Sep-2019 11:51; Elizabeth Nixon (RN); [employer]; [id number] (Exp. Date: 07-Apr-2021); IntraMuscular; Deltoid Right.; 0.5 milliLiter(s); VIS (VIS Published: 09-May-2013, VIS Presented: 15-Sep-2019);

## 2023-01-24 NOTE — DISCHARGE NOTE PROVIDER - HOSPITAL COURSE
Patient is 66 yo female with hx of MS presenting with       1. Seizure like activity  -Head CT scan shows no acute process  -VEEG shows no seizure activity  -Outpatient follow up with neurology     Hospital course, and discharge planning were discussed with patient, and  in details.  all questions were answered.  seems to understand, and in agreement.  time 70 min

## 2023-01-24 NOTE — DISCHARGE NOTE PROVIDER - CARE PROVIDERS DIRECT ADDRESSES
[email or web address]

## 2023-01-24 NOTE — PROGRESS NOTE ADULT - SUBJECTIVE AND OBJECTIVE BOX
Epilepsy Attending Note:     STAN ESCOBAR    67y Female  MRN [id number]    Vital Signs Last 24 Hrs  T(C): 36 (23 Jan 2023 05:00), Max: 36 (23 Jan 2023 05:00)  T(F): 96.8 (23 Jan 2023 05:00), Max: 96.8 (23 Jan 2023 05:00)  HR: 60 (23 Jan 2023 05:00) (60 - 62)  BP: 126/64 (23 Jan 2023 05:00) (122/55 - 127/66)  BP(mean): --  RR: 16 (23 Jan 2023 05:00) (16 - 16)  SpO2: --                              14.0   6.68  )-----------( 230      ( 22 Jan 2023 07:46 )             41.4       01-22    140  |  105  |  13  ----------------------------<  92  4.5   |  26  |  0.7    Ca    9.4      22 Jan 2023 07:46  Mg     2.0     01-21    TPro  5.9<L>  /  Alb  3.8  /  TBili  0.4  /  DBili  x   /  AST  24  /  ALT  32  /  AlkPhos  63  01-22      MEDICATIONS  (STANDING):    MEDICATIONS  (PRN):  acetaminophen     Tablet .. 650 milliGRAM(s) Oral every 6 hours PRN Temp greater or equal to 38C (100.4F), Mild Pain (1 - 3)  aluminum hydroxide/magnesium hydroxide/simethicone Suspension 30 milliLiter(s) Oral every 4 hours PRN Dyspepsia  LORazepam   Injectable 2 milliGRAM(s) IV Push three times a day PRN generalized tonic-clonic seizure lasting longer than 2 minutes, or two consecutive seizures without return to baseline in-between  melatonin 3 milliGRAM(s) Oral at bedtime PRN Insomnia  ondansetron Injectable 4 milliGRAM(s) IV Push every 8 hours PRN Nausea and/or Vomiting            VEEG in the last 24 hours:    Background------continues, well organized , symmetrical reaching 8-9 hz    Focal and generalized slowing------- no   generalized slowing . mild left mainly posterior quadrant and TO focal slowing    Interictal activity---------------none    Events---------------  none    Seizures----   none    Impression:   abnormal as above    Plan - continue the monitoring. sleep deprivation tonight. HV and PS. seizure precaution    
Epilepsy Attending Note:     STAN ESCOBAR    67y Female  MRN [id number]    Vital Signs Last 24 Hrs  T(C): 36 (2023 04:50), Max: 36 (2023 04:50)  T(F): 96.8 (2023 04:50), Max: 96.8 (2023 04:50)  HR: 61 (2023 04:50) (61 - 66)  BP: 117/57 (2023 04:50) (117/57 - 122/68)  BP(mean): --  RR: 18 (2023 04:50) (16 - 18)  SpO2: 93% (2023 20:40) (93% - 93%)                    MEDICATIONS  (STANDING):    MEDICATIONS  (PRN):  acetaminophen     Tablet .. 650 milliGRAM(s) Oral every 6 hours PRN Temp greater or equal to 38C (100.4F), Mild Pain (1 - 3)  aluminum hydroxide/magnesium hydroxide/simethicone Suspension 30 milliLiter(s) Oral every 4 hours PRN Dyspepsia  LORazepam   Injectable 2 milliGRAM(s) IV Push three times a day PRN generalized tonic-clonic seizure lasting longer than 2 minutes, or two consecutive seizures without return to baseline in-between  melatonin 3 milliGRAM(s) Oral at bedtime PRN Insomnia  ondansetron Injectable 4 milliGRAM(s) IV Push every 8 hours PRN Nausea and/or Vomiting            VEEG in the last 24 hours:    Background - continuous, symmetrical, well organized, reaching frequencies in the range of 8-9 Hz    Focal and generalized slowin. no generalized slowing  2. mild left mainly posterior quadrant and T-O focal slowing    Interictal activity - none    Events - none    Seizures - none    Impression: VEEG as above    Plan -   Will d/c monitoring  No seizure medications  Follow up as scheduled  MRI with/without contrast prior to follow up  Plan discussed with patient and family.
Epilepsy Team Discharge Note:    VEEG monitoring completed, patient is cleared for discharge from neurology standpoint.    Follow up neurology appointment is scheduled with Dr. Fields   for February 27, 2023 at 2 pm.    [address] [place] [address], [address]  [phone]    No seizure medications.    MRI brain with/without contrast prior to follow up. Patient will be contacted after insurance authorization obtained.    Detailed instructions regarding follow up plan are given to the patient, all questions answered. Patient verbalized understanding.    Discussed with hospitalist and nursing teams.  
CC.  Seizure like activity  HPI.  Patient reports that she feels better  Offers no other complaints      Constitutional: No fever, fatigue or weight loss.  Skin: No rash.  Eyes: No recent vision problems or eye pain.  ENT: No congestion, ear pain, or sore throat.  Endocrine: No thyroid problems.  Cardiovascular: No chest pain or palpation.  Respiratory: No cough, shortness of breath, congestion, or wheezing.  Gastrointestinal: No abdominal pain, nausea, vomiting, or diarrhea.  Genitourinary: No dysuria.  Musculoskeletal: No joint swelling.  Neurologic: No headache.      Vital Signs Last 24 Hrs  T(C): 36 (23 Jan 2023 05:00), Max: 36 (23 Jan 2023 05:00)  T(F): 96.8 (23 Jan 2023 05:00), Max: 96.8 (23 Jan 2023 05:00)  HR: 60 (23 Jan 2023 05:00) (60 - 62)  BP: 126/64 (23 Jan 2023 05:00) (122/55 - 127/66)  BP(mean): --  RR: 16 (23 Jan 2023 05:00) (16 - 16)  SpO2: --          PHYSICAL EXAM-  GENERAL: NAD,   HEAD:  Atraumatic, Normocephalic  EYES: EOMI, PERRLA, conjunctiva and sclera clear  NECK: Supple, No JVD, Normal thyroid  NERVOUS SYSTEM:  Alert & Oriented X3, Moving all extremities  CHEST/LUNG: Clear to percussion bilaterally; No rales, rhonchi, wheezing, or rubs  HEART: Regular rate and rhythm; No murmurs, rubs, or gallops  ABDOMEN: Soft, Nontender, Nondistended; Bowel sounds present  EXTREMITIES:    No clubbing, cyanosis, or edema  SKIN: No rashes or lesions           .  LABS:                         14.0   6.68  )-----------( 230      ( 22 Jan 2023 07:46 )             41.4     01-22    140  |  105  |  13  ----------------------------<  92  4.5   |  26  |  0.7    Ca    9.4      22 Jan 2023 07:46  Mg     2.0     01-21    TPro  5.9<L>  /  Alb  3.8  /  TBili  0.4  /  DBili  x   /  AST  24  /  ALT  32  /  AlkPhos  63  01-22              MEDICATIONS  (STANDING):    MEDICATIONS  (PRN):  acetaminophen     Tablet .. 650 milliGRAM(s) Oral every 6 hours PRN Temp greater or equal to 38C (100.4F), Mild Pain (1 - 3)  aluminum hydroxide/magnesium hydroxide/simethicone Suspension 30 milliLiter(s) Oral every 4 hours PRN Dyspepsia  LORazepam   Injectable 2 milliGRAM(s) IV Push every 2 hours PRN seizure  melatonin 3 milliGRAM(s) Oral at bedtime PRN Insomnia  ondansetron Injectable 4 milliGRAM(s) IV Push every 8 hours PRN Nausea and/or Vomiting        Imaging Personally Reviewed:     [x ] YES  [ ] NO    Consultant(s) Notes Reviewed:  [x ] YES  [ ] NO    Care Discussed with Consultants/Other Providers [x ] YES  [ ] NO  
CC.  Seizure like activity  HPI.  Patient reports that she feels better  Offers no other complaints      Constitutional: No fever, fatigue or weight loss.  Skin: No rash.  Eyes: No recent vision problems or eye pain.  ENT: No congestion, ear pain, or sore throat.  Endocrine: No thyroid problems.  Cardiovascular: No chest pain or palpation.  Respiratory: No cough, shortness of breath, congestion, or wheezing.  Gastrointestinal: No abdominal pain, nausea, vomiting, or diarrhea.  Genitourinary: No dysuria.  Musculoskeletal: No joint swelling.  Neurologic: No headache.      Vital Signs Last 24 Hrs  T(C): 35.9 (01-22-23 @ 06:37), Max: 36.1 (01-21-23 @ 16:39)  T(F): 96.7 (01-22-23 @ 06:37), Max: 97 (01-21-23 @ 16:39)  HR: 70 (01-22-23 @ 06:37) (70 - 94)  BP: 110/53 (01-22-23 @ 06:37) (110/53 - 161/106)  BP(mean): 99 (01-21-23 @ 22:21) (99 - 99)  RR: 18 (01-22-23 @ 06:37) (16 - 19)  SpO2: 97% (01-21-23 @ 16:39) (97% - 97%)        PHYSICAL EXAM-  GENERAL: NAD,   HEAD:  Atraumatic, Normocephalic  EYES: EOMI, PERRLA, conjunctiva and sclera clear  NECK: Supple, No JVD, Normal thyroid  NERVOUS SYSTEM:  Alert & Oriented X3, Moving all extremities  CHEST/LUNG: Clear to percussion bilaterally; No rales, rhonchi, wheezing, or rubs  HEART: Regular rate and rhythm; No murmurs, rubs, or gallops  ABDOMEN: Soft, Nontender, Nondistended; Bowel sounds present  EXTREMITIES:    No clubbing, cyanosis, or edema  SKIN: No rashes or lesions                                  14.0   6.68  )-----------( 230      ( 22 Jan 2023 07:46 )             41.4     01-22    140  |  105  |  13  ----------------------------<  92  4.5   |  26  |  0.7    Ca    9.4      22 Jan 2023 07:46  Mg     2.0     01-21    TPro  5.9<L>  /  Alb  3.8  /  TBili  0.4  /  DBili  x   /  AST  24  /  ALT  32  /  AlkPhos  63  01-22                    MEDICATIONS  (STANDING):    MEDICATIONS  (PRN):  acetaminophen     Tablet .. 650 milliGRAM(s) Oral every 6 hours PRN Temp greater or equal to 38C (100.4F), Mild Pain (1 - 3)  aluminum hydroxide/magnesium hydroxide/simethicone Suspension 30 milliLiter(s) Oral every 4 hours PRN Dyspepsia  LORazepam   Injectable 2 milliGRAM(s) IV Push every 2 hours PRN seizure  melatonin 3 milliGRAM(s) Oral at bedtime PRN Insomnia  ondansetron Injectable 4 milliGRAM(s) IV Push every 8 hours PRN Nausea and/or Vomiting        Imaging Personally Reviewed:     [x ] YES  [ ] NO    Consultant(s) Notes Reviewed:  [x ] YES  [ ] NO    Care Discussed with Consultants/Other Providers [x ] YES  [ ] NO  
CC.  Seizure like activity  HPI.  Patient reports that she feels better  no seizure activity  Offers no other complaints      Constitutional: No fever, fatigue or weight loss.  Skin: No rash.  Eyes: No recent vision problems or eye pain.  ENT: No congestion, ear pain, or sore throat.  Endocrine: No thyroid problems.  Cardiovascular: No chest pain or palpation.  Respiratory: No cough, shortness of breath, congestion, or wheezing.  Gastrointestinal: No abdominal pain, nausea, vomiting, or diarrhea.  Genitourinary: No dysuria.  Musculoskeletal: No joint swelling.  Neurologic: No headache.      Vital Signs Last 24 Hrs  T(C): 36 (24 Jan 2023 04:50), Max: 36 (24 Jan 2023 04:50)  T(F): 96.8 (24 Jan 2023 04:50), Max: 96.8 (24 Jan 2023 04:50)  HR: 61 (24 Jan 2023 04:50) (61 - 66)  BP: 117/57 (24 Jan 2023 04:50) (117/57 - 122/68)  BP(mean): --  RR: 18 (24 Jan 2023 04:50) (16 - 18)  SpO2: 93% (23 Jan 2023 20:40) (93% - 93%)        PHYSICAL EXAM-  GENERAL: NAD,   HEAD:  Atraumatic, Normocephalic  EYES: EOMI, PERRLA, conjunctiva and sclera clear  NECK: Supple, No JVD, Normal thyroid  NERVOUS SYSTEM:  Alert & Oriented X3, Moving all extremities  CHEST/LUNG: Clear to percussion bilaterally; No rales, rhonchi, wheezing, or rubs  HEART: Regular rate and rhythm; No murmurs, rubs, or gallops  ABDOMEN: Soft, Nontender, Nondistended; Bowel sounds present  EXTREMITIES:    No clubbing, cyanosis, or edema  SKIN: No rashes or lesions                        MEDICATIONS  (STANDING):    MEDICATIONS  (PRN):  acetaminophen     Tablet .. 650 milliGRAM(s) Oral every 6 hours PRN Temp greater or equal to 38C (100.4F), Mild Pain (1 - 3)  aluminum hydroxide/magnesium hydroxide/simethicone Suspension 30 milliLiter(s) Oral every 4 hours PRN Dyspepsia  LORazepam   Injectable 2 milliGRAM(s) IV Push every 2 hours PRN seizure  melatonin 3 milliGRAM(s) Oral at bedtime PRN Insomnia  ondansetron Injectable 4 milliGRAM(s) IV Push every 8 hours PRN Nausea and/or Vomiting        Imaging Personally Reviewed:     [x ] YES  [ ] NO    Consultant(s) Notes Reviewed:  [x ] YES  [ ] NO    Care Discussed with Consultants/Other Providers [x ] YES  [ ] NO

## 2023-01-24 NOTE — DISCHARGE NOTE PROVIDER - NSDCCPCAREPLAN_GEN_ALL_CORE_FT
PRINCIPAL DISCHARGE DIAGNOSIS  Diagnosis: Seizure  Assessment and Plan of Treatment: outpatient follow up with neurology

## 2023-01-24 NOTE — DISCHARGE NOTE PROVIDER - NSDCFUSCHEDAPPT_GEN_ALL_CORE_FT
Magdaleno Fields  [facility] Physician [facility]  NEUROLOGY [address] [place] [address]  Scheduled Appointment: 02/27/2023

## 2023-01-24 NOTE — DISCHARGE NOTE PROVIDER - CARE PROVIDER_API CALL
Magdaleno Fields)  Neurology  [address] [place] [address], [address]  [place] [address]  Phone: [phone]  Fax: [phone]  Follow Up Time: 1 week    Sara Quevedo)  Geriatric Medicine; Internal Medicine  [address] [place] [address]  Phone: [phone]  Fax: [phone]  Follow Up Time: 1 week

## 2023-01-27 DIAGNOSIS — Z86.16 PERSONAL HISTORY OF COVID-19: ICD-10-CM

## 2023-01-27 DIAGNOSIS — Z88.1 ALLERGY STATUS TO OTHER ANTIBIOTIC AGENTS STATUS: ICD-10-CM

## 2023-01-27 DIAGNOSIS — R56.9 UNSPECIFIED CONVULSIONS: ICD-10-CM

## 2023-01-27 DIAGNOSIS — G35 MULTIPLE SCLEROSIS: ICD-10-CM

## 2023-01-27 DIAGNOSIS — Z20.822 CONTACT WITH AND (SUSPECTED) EXPOSURE TO COVID-19: ICD-10-CM

## 2023-01-27 DIAGNOSIS — F17.210 NICOTINE DEPENDENCE, CIGARETTES, UNCOMPLICATED: ICD-10-CM

## 2023-01-29 ENCOUNTER — OUTPATIENT (OUTPATIENT)
Dept: OUTPATIENT SERVICES | Facility: HOSPITAL | Age: 68
LOS: 1 days | Discharge: HOME | End: 2023-01-29
Payer: MEDICARE

## 2023-01-29 ENCOUNTER — RESULT REVIEW (OUTPATIENT)
Age: 68
End: 2023-01-29

## 2023-01-29 DIAGNOSIS — Z98.82 BREAST IMPLANT STATUS: Chronic | ICD-10-CM

## 2023-01-29 DIAGNOSIS — Z90.710 ACQUIRED ABSENCE OF BOTH CERVIX AND UTERUS: Chronic | ICD-10-CM

## 2023-01-29 DIAGNOSIS — G35 MULTIPLE SCLEROSIS: ICD-10-CM

## 2023-01-29 DIAGNOSIS — Z98.890 OTHER SPECIFIED POSTPROCEDURAL STATES: Chronic | ICD-10-CM

## 2023-01-29 PROCEDURE — 70553 MRI BRAIN STEM W/O & W/DYE: CPT | Mod: 26

## 2023-02-27 ENCOUNTER — APPOINTMENT (OUTPATIENT)
Dept: NEUROLOGY | Facility: CLINIC | Age: 68
End: 2023-02-27
Payer: MEDICARE

## 2023-02-27 VITALS
HEIGHT: 64.5 IN | HEART RATE: 88 BPM | BODY MASS INDEX: 22.43 KG/M2 | OXYGEN SATURATION: 97 % | TEMPERATURE: 97.9 F | WEIGHT: 133 LBS | SYSTOLIC BLOOD PRESSURE: 128 MMHG | DIASTOLIC BLOOD PRESSURE: 84 MMHG

## 2023-02-27 DIAGNOSIS — F17.200 NICOTINE DEPENDENCE, UNSPECIFIED, UNCOMPLICATED: ICD-10-CM

## 2023-02-27 PROCEDURE — 99215 OFFICE O/P EST HI 40 MIN: CPT

## 2023-02-27 RX ORDER — ATORVASTATIN CALCIUM 10 MG/1
10 TABLET, FILM COATED ORAL DAILY
Refills: 0 | Status: ACTIVE | COMMUNITY

## 2023-02-27 RX ORDER — PANTOPRAZOLE 40 MG/1
40 TABLET, DELAYED RELEASE ORAL
Refills: 0 | Status: ACTIVE | COMMUNITY

## 2023-03-01 NOTE — HISTORY OF PRESENT ILLNESS
[FreeTextEntry1] : Sonia is a 67 year old right handed female with PMH of HLD, [place] Palsy and MS presented to the office after hospitalization for  whole body "twitching".\par January 2023 pt had whole body "twitching"  that lasted for 30 seconds PTA to the hospital. Described as tonic/clonic shaking. - LOC,  - bowel/bladder incontinence, - tongue laceration, - post-ictal confusion. \par Pt was admitted and had VEEG which showed no generalized slowing; mild left mainly posterior quadrant and T-O focal slowing. No interictal activity.\par Subsequently pt was discharged without AED and suggested to be evaluated by Cardiology as her episode was more suggestive of syncope.\par \par Since discharge from the hospital pt did not have any events suggestive of seizure.\par She was seen by cardiology and is currently wearing a Holter monitor. She is also scheduled to have stress test and carotid ultrasound. \par \par Pt has been under the care of Dr Power for MS and is in need of a new neurologist. She was on Tecfedara at one point but was unable to tolerate adverse effects . \par Pt has also been receiving Botox injections every 3 months for blepharospasm of right eye. \par \par Pt had MRI of the brain January 2023 : Multiple foci of white matter signal abnormality, stable compared to the previous brain MRI dated 5/24/21. No new or enhancing lesions are demonstrated.

## 2023-03-01 NOTE — DISCUSSION/SUMMARY
[FreeTextEntry1] : Sonia is a 67 year old right handed female with PMH of HLD, [place] Palsy and MS presented to the office after hospitalization for  whole body "twitching" which was suggestive of syncope.\par \par Plan:\par - REEG in 6 months\par - follow up in 6 months\par - Refer for botox for blepharospasm\par - discussed importance of rest and sleep\par \par Case discussed with Dr. Fields\par \par Ciarra Pan, DNP, ACNP-BC

## 2023-03-01 NOTE — PHYSICAL EXAM
[General Appearance - Alert] : alert [General Appearance - In No Acute Distress] : in no acute distress [Oriented To Time, Place, And Person] : oriented to person, place, and time [Person] : oriented to person [Place] : oriented to place [Time] : oriented to time [Short Term Intact] : short term memory intact [Concentration Intact] : normal concentrating ability [Naming Objects] : no difficulty naming common objects [Cranial Nerves Optic (II)] : visual acuity intact bilaterally,  visual fields full to confrontation, pupils equal round and reactive to light [Cranial Nerves Oculomotor (III)] : extraocular motion intact [Cranial Nerves Trigeminal (V)] : facial sensation intact symmetrically [Cranial Nerves Facial (VII)] : face symmetrical [Cranial Nerves Glossopharyngeal (IX)] : tongue and palate midline [Cranial Nerves Vestibulocochlear (VIII)] : hearing was intact bilaterally [Cranial Nerves Accessory (XI - Cranial And Spinal)] : head turning and shoulder shrug symmetric [Cranial Nerves Hypoglossal (XII)] : there was no tongue deviation with protrusion [Involuntary Movements] : no involuntary movements were seen [Motor Handedness Right-Handed] : the patient is right hand dominant [Abnormal Walk] : normal gait [Balance] : balance was intact [2+] : Ankle jerk left 2+ [Paresis Pronator Drift Right-Sided] : no pronator drift on the right [Paresis Pronator Drift Left-Sided] : no pronator drift on the left [Motor Strength Upper Extremities Bilaterally] : strength was normal in both upper extremities [Motor Strength Lower Extremities Bilaterally] : strength was normal in both lower extremities [Romberg's Sign] : Romberg's sign was negtive [Limited Balance] : balance was intact [Past-pointing] : there was no past-pointing [Tremor] : no tremor present [Coordination - Dysmetria Impaired Finger-to-Nose Bilateral] : not present

## 2023-03-13 ENCOUNTER — NON-APPOINTMENT (OUTPATIENT)
Age: 68
End: 2023-03-13

## 2023-03-13 ENCOUNTER — APPOINTMENT (OUTPATIENT)
Dept: NEUROLOGY | Facility: CLINIC | Age: 68
End: 2023-03-13
Payer: MEDICARE

## 2023-03-13 PROCEDURE — 64612 DESTROY NERVE FACE MUSCLE: CPT

## 2023-03-13 PROCEDURE — ZZZZZ: CPT

## 2023-03-13 PROCEDURE — 99213 OFFICE O/P EST LOW 20 MIN: CPT | Mod: 25

## 2023-03-13 NOTE — PHYSICAL EXAM
[FreeTextEntry1] : Focused exam: \par NAD.  AOx3.  Intact memory.  Speech fluent, nondysarthric.  CN 2 – 12 w/ mild-moderate R hemifacial spasm and asymmetry of the face.  \par Strength grossly intact.  NL tone, bulk.  No abnl movements.  Plantar response downgoing b/l.  Gait narrow based/NL tandem.\par \par

## 2023-03-13 NOTE — PROCEDURE
[FreeTextEntry1] : Procedure:  Chemodenervation with botulinum for hemifacial spasm\par \par Risks and benefits of procedure explained.  Consent for botulinum toxin for chronic migraine obtained verbally.  \par \par 100 Units botulinum toxin (Lot # C4, exp: 5/25 ) reconstituted with 1 ml 0.9NS (1:1 dil).  \par \par Patient prepped with alcohol/ethyl chloride.  15U orb oculi\par \par Patient tolerated procedure well without complications.  EBL minimal.\par

## 2023-03-13 NOTE — ASSESSMENT
[FreeTextEntry1] : 68 yo F w/ h/o MS, ?SZD and chronic R hemifacial spasm referred for chemodenervation.  Discussed with patient at length regarding prior injections: wants to avoid lower face injections and will focus on R Orb oculi.  Will increase dose slightly from 12.5 > 15U given response to last injection and instructed pt on taking video/picture if significant drooping noted.  Pt tolerated procedure well.  \par \par Recommendations:\par - RTC in 3 months for additional chemodenervation\par - f/u with Dr. Fields for continue neurologic treatment

## 2023-03-13 NOTE — HISTORY OF PRESENT ILLNESS
[FreeTextEntry1] : Pt currently here for chemodenervation of R hemifacial spasm.  Had been getting injections from Dr. Power previously and had been doing well with injections in the R Orb oculi.  Had previous significant facial droop with lower face injections and wants to avoid this.  Did have good response to last dose but feels can get higher dose for longer efficacy.  Typically lasts 2 - 3 months after each injection.   Follows with Dr. Fields for MS and new-onset seizure.  Denies any history of neuromuscular disorders.

## 2023-03-14 RX ORDER — ONABOTULINUMTOXINA 100 [USP'U]/1
100 INJECTION, POWDER, LYOPHILIZED, FOR SOLUTION INTRADERMAL; INTRAMUSCULAR
Qty: 1 | Refills: 0 | Status: COMPLETED | OUTPATIENT
Start: 2023-03-13

## 2023-03-24 RX ORDER — GABAPENTIN 300 MG/1
300 CAPSULE ORAL DAILY
Qty: 180 | Refills: 1 | Status: ACTIVE | COMMUNITY
Start: 2020-10-14 | End: 1900-01-01

## 2023-06-05 ENCOUNTER — APPOINTMENT (OUTPATIENT)
Dept: NEUROLOGY | Facility: CLINIC | Age: 68
End: 2023-06-05
Payer: MEDICARE

## 2023-06-05 PROCEDURE — 64612 DESTROY NERVE FACE MUSCLE: CPT

## 2023-06-05 PROCEDURE — 99212 OFFICE O/P EST SF 10 MIN: CPT | Mod: 25

## 2023-06-05 RX ADMIN — ONABOTULINUMTOXINA 0.15 UNIT: 100 INJECTION, POWDER, LYOPHILIZED, FOR SOLUTION INTRADERMAL; INTRAMUSCULAR at 00:00

## 2023-06-05 NOTE — PHYSICAL EXAM
[FreeTextEntry1] : Focused exam: \par NAD.  AOx3.  Intact memory.  Speech fluent, nondysarthric.  CN 2 – 12 w/ mild-moderate R hemifacial spasm and asymmetry of the face.  Strength grossly intact.  NL tone, bulk.  No abnl movements.  Plantar response downgoing b/l.  Gait narrow based/NL tandem.\par \par

## 2023-06-05 NOTE — HISTORY OF PRESENT ILLNESS
[FreeTextEntry1] : Since her last visit, Ms. Root is doing well.  Had good response to 15U total last visit with slight drooping of the R eyelid but otherwise able to open and close eye.  Continues to have twitching of the lower face which affects her but wants to continue avoiding treatment since drooping after treatment worse.  Is otherwise in her usual state of health.

## 2023-06-05 NOTE — ASSESSMENT
[FreeTextEntry1] : 67 yo F w/ h/o MS, ?SZD and chronic R hemifacial spasm here for chemodenervation R Orb oculi and avoid lower face for now since pt priority to avoid asymmetry of the lower face which was worse with chemodenervation in the past.   Pt tolerated procedure well without any significant side effects.  \par \par Recommendations:\par - RTC in 3 months for additional chemodenervation\par - f/u with Dr. Fields for continued neurologic followup for MS/SZD

## 2023-06-05 NOTE — PROCEDURE
[FreeTextEntry1] : Procedure:  Chemodenervation with botulinum for hemifacial spasm\par Risks and benefits of procedure explained.  Consent for botulinum toxin for chronic migraine obtained verbally.  \par 100 Units botulinum toxin (Lot #[id number] C4, exp: 09/2025 ) reconstituted with 1 ml 0.9NS (1:1 dil).  \par Patient prepped with alcohol/ethyl chloride.  15U orb oculi\par Patient tolerated procedure well without complications.  EBL minimal.\par

## 2023-06-19 RX ORDER — ONABOTULINUMTOXINA 100 [USP'U]/1
100 INJECTION, POWDER, LYOPHILIZED, FOR SOLUTION INTRADERMAL; INTRAMUSCULAR
Qty: 1 | Refills: 0 | Status: COMPLETED | OUTPATIENT
Start: 2023-06-05

## 2023-07-17 ENCOUNTER — APPOINTMENT (OUTPATIENT)
Dept: NEUROLOGY | Facility: CLINIC | Age: 68
End: 2023-07-17

## 2023-08-09 ENCOUNTER — APPOINTMENT (OUTPATIENT)
Dept: NEUROLOGY | Facility: CLINIC | Age: 68
End: 2023-08-09
Payer: MEDICARE

## 2023-08-09 VITALS
SYSTOLIC BLOOD PRESSURE: 124 MMHG | HEIGHT: 64 IN | WEIGHT: 135 LBS | DIASTOLIC BLOOD PRESSURE: 69 MMHG | HEART RATE: 70 BPM | OXYGEN SATURATION: 100 % | BODY MASS INDEX: 23.05 KG/M2 | TEMPERATURE: 96.5 F

## 2023-08-09 PROCEDURE — 99214 OFFICE O/P EST MOD 30 MIN: CPT

## 2023-08-09 RX ORDER — METHOCARBAMOL 500 MG/1
500 TABLET, FILM COATED ORAL
Qty: 15 | Refills: 1 | Status: ACTIVE | COMMUNITY
Start: 2023-02-27 | End: 1900-01-01

## 2023-08-11 NOTE — PHYSICAL EXAM
[FreeTextEntry1] : A/A/Ox 3 good mood but also emotional good attention follows 4 step commands Good color appreciation no field cut tendency to sit with a scoliotic ( leaning right) posture No drift no dysmetria stable gait + decreased sensation right L4 negative Romberg

## 2023-08-11 NOTE — HISTORY OF PRESENT ILLNESS
[FreeTextEntry1] : Sonia is here with her  .Rom says she is doing well and has had no events suggestive of seizure.They are both happy having a brand new grand child. She says overall she is doing well. However one thing that is bothering her and at times interfering with her sleep, is tingling and dysesthesia in her feet. It mostly involves the lateral aspect of the foot and atimes she feels pain at the MTP area No radiating pain reported. She does have some back pain she says she cannot sleep well Her facial twitching comes and goes . She is slightly emotional describing herself as being depressed . Prefers not to go any where  No change in her balance and strength. No B/B changes She is for the most part careful with her diet. Did not gain weighty

## 2023-08-11 NOTE — ASSESSMENT
[FreeTextEntry1] : 1- Right hemifacial spasm 2- MS 3- one episode of seizure like activity 4- spine disease 5- R/O small fiber neuropathy +/- lower LS radiculopathy 6- mood D/O   plan start small ( 5 mg) of Elavil and talk on the phone

## 2023-09-18 ENCOUNTER — APPOINTMENT (OUTPATIENT)
Dept: NEUROLOGY | Facility: CLINIC | Age: 68
End: 2023-09-18
Payer: MEDICARE

## 2023-09-18 PROCEDURE — 99213 OFFICE O/P EST LOW 20 MIN: CPT

## 2024-01-29 ENCOUNTER — APPOINTMENT (OUTPATIENT)
Dept: NEUROLOGY | Facility: CLINIC | Age: 69
End: 2024-01-29

## 2024-02-15 ENCOUNTER — APPOINTMENT (OUTPATIENT)
Dept: NEUROLOGY | Facility: CLINIC | Age: 69
End: 2024-02-15
Payer: MEDICARE

## 2024-02-15 VITALS
BODY MASS INDEX: 22.71 KG/M2 | DIASTOLIC BLOOD PRESSURE: 72 MMHG | OXYGEN SATURATION: 100 % | HEIGHT: 64 IN | TEMPERATURE: 97.6 F | WEIGHT: 133 LBS | SYSTOLIC BLOOD PRESSURE: 126 MMHG | HEART RATE: 67 BPM

## 2024-02-15 DIAGNOSIS — M54.50 LOW BACK PAIN, UNSPECIFIED: ICD-10-CM

## 2024-02-15 DIAGNOSIS — F41.9 ANXIETY DISORDER, UNSPECIFIED: ICD-10-CM

## 2024-02-15 DIAGNOSIS — G35 MULTIPLE SCLEROSIS: ICD-10-CM

## 2024-02-15 PROCEDURE — 95816 EEG AWAKE AND DROWSY: CPT

## 2024-02-15 PROCEDURE — 99214 OFFICE O/P EST MOD 30 MIN: CPT

## 2024-02-15 NOTE — PHYSICAL EXAM
[FreeTextEntry1] : A/A/Ox3 good mood good attention interacting well No spasm in the face no dysarthria No field cut full ROM of EOM no drift no dysmetria stable gait slight hip tilt to the right

## 2024-02-15 NOTE — ASSESSMENT
[FreeTextEntry1] : 1 - Hx of CNS demyelinating disease 2-facial spasm 3- mood D/O 4- one episode of seizure like activity R/O syncope 5- spine disease    plan increase Elavil to 10 bid F/U discussed exercise and posture

## 2024-02-15 NOTE — HISTORY OF PRESENT ILLNESS
[FreeTextEntry1] : Sonia is here with her  , She is doing well. Very happy with the Elavil She is not emotional as before  She believes still there is room for improvement in her mood and asking whether we can increase the dose. No event . she denies having visual symptoms. no difficulty with color appreciation No dizziness or vertigo No balance issues some degree of bladder incontinence. , She did have bladder lift in the past and did better for some time , It is coming back No back pain sleep pattern is OK. She does go back to bed late They eat healthy Today she did have an EEG done in the office that is normal She stopped getting Botox injections for the facial spasm

## 2024-02-29 ENCOUNTER — RX RENEWAL (OUTPATIENT)
Age: 69
End: 2024-02-29

## 2024-04-01 ENCOUNTER — RX RENEWAL (OUTPATIENT)
Age: 69
End: 2024-04-01

## 2024-04-01 RX ORDER — AMITRIPTYLINE HYDROCHLORIDE 10 MG/1
10 TABLET, FILM COATED ORAL
Qty: 30 | Refills: 3 | Status: ACTIVE | COMMUNITY
Start: 2023-08-09 | End: 1900-01-01

## 2024-08-07 ENCOUNTER — NON-APPOINTMENT (OUTPATIENT)
Age: 69
End: 2024-08-07

## 2024-08-08 ENCOUNTER — APPOINTMENT (OUTPATIENT)
Dept: NEUROLOGY | Facility: CLINIC | Age: 69
End: 2024-08-08

## 2024-08-08 PROCEDURE — 99213 OFFICE O/P EST LOW 20 MIN: CPT

## 2024-08-10 NOTE — ASSESSMENT
[FreeTextEntry1] : 1- Right hemifacial spasm 2- MS 3- one episode of seizure like activity 4- spine disease 5- R/O small fiber neuropathy +/- lower LS radiculopathy 6- mood D/O   Plan continue current level F/U

## 2024-08-10 NOTE — HISTORY OF PRESENT ILLNESS
[FreeTextEntry1] : Sonia is here with her  Very happy and content She sleeps well. Has good mood denies any visual change No change in the mood and memory. She is now getting Botox injection from a different physician and is happier with the outcome. No change in B/B function No change in strength No issues with her balance Her EEG in Feb. was normal

## 2024-09-19 ENCOUNTER — OUTPATIENT (OUTPATIENT)
Dept: OUTPATIENT SERVICES | Facility: HOSPITAL | Age: 69
LOS: 1 days | End: 2024-09-19
Payer: MEDICARE

## 2024-09-19 DIAGNOSIS — Z00.8 ENCOUNTER FOR OTHER GENERAL EXAMINATION: ICD-10-CM

## 2024-09-19 DIAGNOSIS — Z98.890 OTHER SPECIFIED POSTPROCEDURAL STATES: Chronic | ICD-10-CM

## 2024-09-19 DIAGNOSIS — Z90.710 ACQUIRED ABSENCE OF BOTH CERVIX AND UTERUS: Chronic | ICD-10-CM

## 2024-09-19 DIAGNOSIS — R06.02 SHORTNESS OF BREATH: ICD-10-CM

## 2024-09-19 DIAGNOSIS — Z98.82 BREAST IMPLANT STATUS: Chronic | ICD-10-CM

## 2024-09-19 PROCEDURE — 75574 CT ANGIO HRT W/3D IMAGE: CPT | Mod: 26

## 2024-09-19 PROCEDURE — 75574 CT ANGIO HRT W/3D IMAGE: CPT

## 2024-09-20 DIAGNOSIS — R06.02 SHORTNESS OF BREATH: ICD-10-CM

## 2024-11-26 ENCOUNTER — APPOINTMENT (OUTPATIENT)
Dept: GASTROENTEROLOGY | Facility: CLINIC | Age: 69
End: 2024-11-26
Payer: MEDICARE

## 2024-11-26 VITALS
SYSTOLIC BLOOD PRESSURE: 126 MMHG | DIASTOLIC BLOOD PRESSURE: 87 MMHG | WEIGHT: 137.4 LBS | BODY MASS INDEX: 23.46 KG/M2 | HEIGHT: 64 IN | OXYGEN SATURATION: 98 % | HEART RATE: 94 BPM

## 2024-11-26 VITALS — HEIGHT: 64 IN

## 2024-11-26 DIAGNOSIS — Z86.59 PERSONAL HISTORY OF OTHER MENTAL AND BEHAVIORAL DISORDERS: ICD-10-CM

## 2024-11-26 DIAGNOSIS — R11.10 VOMITING, UNSPECIFIED: ICD-10-CM

## 2024-11-26 DIAGNOSIS — R13.10 DYSPHAGIA, UNSPECIFIED: ICD-10-CM

## 2024-11-26 DIAGNOSIS — Z87.09 PERSONAL HISTORY OF OTHER DISEASES OF THE RESPIRATORY SYSTEM: ICD-10-CM

## 2024-11-26 DIAGNOSIS — K59.00 CONSTIPATION, UNSPECIFIED: ICD-10-CM

## 2024-11-26 DIAGNOSIS — Z12.11 ENCOUNTER FOR SCREENING FOR MALIGNANT NEOPLASM OF COLON: ICD-10-CM

## 2024-11-26 DIAGNOSIS — Z78.9 OTHER SPECIFIED HEALTH STATUS: ICD-10-CM

## 2024-11-26 DIAGNOSIS — G35 MULTIPLE SCLEROSIS: ICD-10-CM

## 2024-11-26 DIAGNOSIS — R14.0 ABDOMINAL DISTENSION (GASEOUS): ICD-10-CM

## 2024-11-26 DIAGNOSIS — Z87.19 PERSONAL HISTORY OF OTHER DISEASES OF THE DIGESTIVE SYSTEM: ICD-10-CM

## 2024-11-26 DIAGNOSIS — F17.200 NICOTINE DEPENDENCE, UNSPECIFIED, UNCOMPLICATED: ICD-10-CM

## 2024-11-26 DIAGNOSIS — R12 HEARTBURN: ICD-10-CM

## 2024-11-26 PROCEDURE — 99204 OFFICE O/P NEW MOD 45 MIN: CPT

## 2024-11-26 RX ORDER — POLYETHYLENE GLYCOL 3350 AND ELECTROLYTES WITH LEMON FLAVOR 236; 22.74; 6.74; 5.86; 2.97 G/4L; G/4L; G/4L; G/4L; G/4L
236 POWDER, FOR SOLUTION ORAL ONCE
Qty: 2000 | Refills: 0 | Status: ACTIVE | COMMUNITY
Start: 2024-11-26 | End: 1900-01-01

## 2024-11-26 RX ORDER — SODIUM SULFATE, POTASSIUM SULFATE AND MAGNESIUM SULFATE 1.6; 3.13; 17.5 G/177ML; G/177ML; G/177ML
17.5-3.13-1.6 SOLUTION ORAL
Qty: 1 | Refills: 0 | Status: ACTIVE | COMMUNITY
Start: 2024-11-26 | End: 1900-01-01

## 2024-11-26 RX ORDER — LORATADINE 5 MG
17 TABLET,CHEWABLE ORAL
Qty: 1 | Refills: 3 | Status: ACTIVE | COMMUNITY
Start: 2024-11-26 | End: 1900-01-01

## 2024-12-09 NOTE — PHYSICAL EXAM
[FreeTextEntry1] : Neurologic Examination:\par NAD. AOx3.  Intact memory.  Speech fluent, nondysarthric.  CN 2 – 12 normal.  \par Strength 5/5 b/l UE/LE.  NL tone, bulk. No abnl movements.  DTRs 2+ throughout.  Plantar response downgoing b/l.  (-) Hoffmans, clonus.  Sensory intact LT/PP, pain, temp, proprioception and vibration.  NL FTN/HKS.  No dysdiadokinesia.  Gait narrow based/NL tandem.  \par 
MOLECULAR PCR

## 2024-12-20 ENCOUNTER — OUTPATIENT (OUTPATIENT)
Dept: OUTPATIENT SERVICES | Facility: HOSPITAL | Age: 69
LOS: 1 days | Discharge: HOME | End: 2024-12-20
Payer: MEDICARE

## 2024-12-20 ENCOUNTER — RESULT REVIEW (OUTPATIENT)
Age: 69
End: 2024-12-20

## 2024-12-20 ENCOUNTER — TRANSCRIPTION ENCOUNTER (OUTPATIENT)
Age: 69
End: 2024-12-20

## 2024-12-20 VITALS
HEIGHT: 64 IN | WEIGHT: 136.91 LBS | RESPIRATION RATE: 17 BRPM | TEMPERATURE: 98 F | DIASTOLIC BLOOD PRESSURE: 59 MMHG | SYSTOLIC BLOOD PRESSURE: 105 MMHG | HEART RATE: 97 BPM | OXYGEN SATURATION: 97 %

## 2024-12-20 VITALS
RESPIRATION RATE: 18 BRPM | HEART RATE: 70 BPM | DIASTOLIC BLOOD PRESSURE: 56 MMHG | OXYGEN SATURATION: 96 % | SYSTOLIC BLOOD PRESSURE: 120 MMHG

## 2024-12-20 DIAGNOSIS — R14.0 ABDOMINAL DISTENSION (GASEOUS): ICD-10-CM

## 2024-12-20 DIAGNOSIS — K52.9 NONINFECTIVE GASTROENTERITIS AND COLITIS, UNSPECIFIED: ICD-10-CM

## 2024-12-20 DIAGNOSIS — Z98.82 BREAST IMPLANT STATUS: Chronic | ICD-10-CM

## 2024-12-20 DIAGNOSIS — K64.8 OTHER HEMORRHOIDS: ICD-10-CM

## 2024-12-20 DIAGNOSIS — Z90.710 ACQUIRED ABSENCE OF BOTH CERVIX AND UTERUS: Chronic | ICD-10-CM

## 2024-12-20 DIAGNOSIS — K44.9 DIAPHRAGMATIC HERNIA WITHOUT OBSTRUCTION OR GANGRENE: ICD-10-CM

## 2024-12-20 DIAGNOSIS — G35 MULTIPLE SCLEROSIS: ICD-10-CM

## 2024-12-20 DIAGNOSIS — K29.50 UNSPECIFIED CHRONIC GASTRITIS WITHOUT BLEEDING: ICD-10-CM

## 2024-12-20 DIAGNOSIS — K29.80 DUODENITIS WITHOUT BLEEDING: ICD-10-CM

## 2024-12-20 DIAGNOSIS — Z98.890 OTHER SPECIFIED POSTPROCEDURAL STATES: Chronic | ICD-10-CM

## 2024-12-20 DIAGNOSIS — R13.10 DYSPHAGIA, UNSPECIFIED: ICD-10-CM

## 2024-12-20 DIAGNOSIS — K59.00 CONSTIPATION, UNSPECIFIED: ICD-10-CM

## 2024-12-20 DIAGNOSIS — K21.9 GASTRO-ESOPHAGEAL REFLUX DISEASE WITHOUT ESOPHAGITIS: ICD-10-CM

## 2024-12-20 DIAGNOSIS — K57.30 DIVERTICULOSIS OF LARGE INTESTINE WITHOUT PERFORATION OR ABSCESS WITHOUT BLEEDING: ICD-10-CM

## 2024-12-20 PROCEDURE — 88312 SPECIAL STAINS GROUP 1: CPT

## 2024-12-20 PROCEDURE — 88312 SPECIAL STAINS GROUP 1: CPT | Mod: 26

## 2024-12-20 PROCEDURE — 88305 TISSUE EXAM BY PATHOLOGIST: CPT | Mod: 26

## 2024-12-20 PROCEDURE — 45380 COLONOSCOPY AND BIOPSY: CPT | Mod: XS

## 2024-12-20 PROCEDURE — 88305 TISSUE EXAM BY PATHOLOGIST: CPT

## 2024-12-20 PROCEDURE — 43239 EGD BIOPSY SINGLE/MULTIPLE: CPT

## 2024-12-20 RX ORDER — FAMOTIDINE 20 MG/1
20 TABLET, FILM COATED ORAL
Qty: 30 | Refills: 2 | Status: ACTIVE | COMMUNITY
Start: 2024-12-20 | End: 1900-01-01

## 2024-12-20 RX ORDER — PANTOPRAZOLE 40 MG/1
40 TABLET, DELAYED RELEASE ORAL DAILY
Qty: 30 | Refills: 6 | Status: ACTIVE | COMMUNITY
Start: 2024-12-20 | End: 1900-01-01

## 2024-12-20 NOTE — ASU PATIENT PROFILE, ADULT - AS SC BRADEN MOBILITY
Problem: Activity Intolerance:  Goal: Ability to tolerate increased activity will improve  Ability to tolerate increased activity will improve   Outcome: Ongoing  Pt is able to move in bed. Unable to get out of bed without assistance. PT/OT ordered    Problem: Airway Clearance - Ineffective:  Goal: Ability to maintain a clear airway will improve  Ability to maintain a clear airway will improve   Outcome: Ongoing  No resp. Distress noted. Pt remains on high flow. Bipap at [facility]. Problem: Falls - Risk of  Goal: Absence of falls  Outcome: Ongoing  Bed alarm on. Call light within reach. Pt will remain free from falls/injury by discharge    Problem: Pain:  Goal: Pain level will decrease  Pain level will decrease   Outcome: Ongoing  Pt states current pain regimen is ineffective. RN spoke with physician. New orders received. Cont to monitor effectiveness    Problem: Risk for Impaired Skin Integrity  Goal: Tissue integrity - skin and mucous membranes  Structural intactness and normal physiological function of skin and  mucous membranes. Outcome: Ongoing  Turns Q 2hours cont. Pillow support behind back and underneath bilat heels to float heels. Foam drsg intact on coccyx and bilat heels. No New skin breakdown noted. Problem: Nutrition  Goal: Optimal nutrition therapy  Outcome: Ongoing  Nutritional supplement ordered with meals. Rn will encourage pt to drink. (3) slightly limited

## 2024-12-20 NOTE — ASU DISCHARGE PLAN (ADULT/PEDIATRIC) - CARE PROVIDER_API CALL
Jordin Peralta  Gastroenterology  [address] [place] [address]  Phone: [phone]  Fax: [phone]  Follow Up Time: 1 month

## 2024-12-20 NOTE — ASU PATIENT PROFILE, ADULT - NSICDXPASTSURGICALHX_GEN_ALL_CORE_FT
PAST SURGICAL HISTORY:  H/O breast augmentation     H/O: hysterectomy     History of facial surgery     S/P bilateral breast lumpectomy

## 2024-12-20 NOTE — ASU PATIENT PROFILE, ADULT - FALL HARM RISK - HARM RISK INTERVENTIONS

## 2024-12-20 NOTE — ASU DISCHARGE PLAN (ADULT/PEDIATRIC) - FINANCIAL ASSISTANCE
[facility] provides services at a reduced cost to those who are determined to be eligible through [facility]’s financial assistance program. Information regarding [facility]’s financial assistance program can be found by going to [email or web address] or by calling [phone].

## 2024-12-31 ENCOUNTER — APPOINTMENT (OUTPATIENT)
Dept: GASTROENTEROLOGY | Facility: CLINIC | Age: 69
End: 2024-12-31
Payer: MEDICARE

## 2024-12-31 VITALS
HEART RATE: 88 BPM | HEIGHT: 64 IN | DIASTOLIC BLOOD PRESSURE: 84 MMHG | SYSTOLIC BLOOD PRESSURE: 128 MMHG | WEIGHT: 138 LBS | OXYGEN SATURATION: 98 % | BODY MASS INDEX: 23.56 KG/M2

## 2024-12-31 DIAGNOSIS — R14.0 ABDOMINAL DISTENSION (GASEOUS): ICD-10-CM

## 2024-12-31 DIAGNOSIS — K44.9 DIAPHRAGMATIC HERNIA W/OUT OBSTRUCTION OR GANGRENE: ICD-10-CM

## 2024-12-31 DIAGNOSIS — K57.90 DIVERTICULOSIS OF INTESTINE, PART UNSPECIFIED, W/OUT PERFORATION OR ABSCESS W/OUT BLEEDING: ICD-10-CM

## 2024-12-31 DIAGNOSIS — R13.10 DYSPHAGIA, UNSPECIFIED: ICD-10-CM

## 2024-12-31 DIAGNOSIS — R12 HEARTBURN: ICD-10-CM

## 2024-12-31 DIAGNOSIS — K59.00 CONSTIPATION, UNSPECIFIED: ICD-10-CM

## 2024-12-31 DIAGNOSIS — Z78.9 OTHER SPECIFIED HEALTH STATUS: ICD-10-CM

## 2024-12-31 DIAGNOSIS — F17.200 NICOTINE DEPENDENCE, UNSPECIFIED, UNCOMPLICATED: ICD-10-CM

## 2024-12-31 PROCEDURE — 99214 OFFICE O/P EST MOD 30 MIN: CPT

## 2024-12-31 RX ORDER — PANTOPRAZOLE 40 MG/1
40 TABLET, DELAYED RELEASE ORAL
Qty: 30 | Refills: 1 | Status: ACTIVE | COMMUNITY
Start: 2024-12-31 | End: 1900-01-01

## 2024-12-31 RX ORDER — LORATADINE 5 MG
17 TABLET,CHEWABLE ORAL
Qty: 1 | Refills: 6 | Status: ACTIVE | COMMUNITY
Start: 2024-12-31 | End: 1900-01-01

## 2024-12-31 RX ORDER — FAMOTIDINE 40 MG/1
40 TABLET, FILM COATED ORAL
Qty: 30 | Refills: 1 | Status: ACTIVE | COMMUNITY
Start: 2024-12-31 | End: 1900-01-01

## 2025-01-02 PROBLEM — R13.10 DYSPHAGIA, UNSPECIFIED TYPE: Status: ACTIVE | Noted: 2025-01-02

## 2025-02-25 ENCOUNTER — APPOINTMENT (OUTPATIENT)
Dept: NEUROLOGY | Facility: CLINIC | Age: 70
End: 2025-02-25

## 2025-04-01 ENCOUNTER — APPOINTMENT (OUTPATIENT)
Dept: GASTROENTEROLOGY | Facility: CLINIC | Age: 70
End: 2025-04-01
Payer: MEDICARE

## 2025-04-01 ENCOUNTER — NON-APPOINTMENT (OUTPATIENT)
Age: 70
End: 2025-04-01

## 2025-04-01 VITALS
HEART RATE: 81 BPM | DIASTOLIC BLOOD PRESSURE: 94 MMHG | SYSTOLIC BLOOD PRESSURE: 127 MMHG | HEIGHT: 64 IN | BODY MASS INDEX: 23.46 KG/M2 | WEIGHT: 137.4 LBS

## 2025-04-01 DIAGNOSIS — F17.200 NICOTINE DEPENDENCE, UNSPECIFIED, UNCOMPLICATED: ICD-10-CM

## 2025-04-01 DIAGNOSIS — K59.00 CONSTIPATION, UNSPECIFIED: ICD-10-CM

## 2025-04-01 DIAGNOSIS — K57.90 DIVERTICULOSIS OF INTESTINE, PART UNSPECIFIED, W/OUT PERFORATION OR ABSCESS W/OUT BLEEDING: ICD-10-CM

## 2025-04-01 DIAGNOSIS — R13.10 DYSPHAGIA, UNSPECIFIED: ICD-10-CM

## 2025-04-01 DIAGNOSIS — R12 HEARTBURN: ICD-10-CM

## 2025-04-01 DIAGNOSIS — Z78.9 OTHER SPECIFIED HEALTH STATUS: ICD-10-CM

## 2025-04-01 DIAGNOSIS — R14.0 ABDOMINAL DISTENSION (GASEOUS): ICD-10-CM

## 2025-04-01 DIAGNOSIS — K44.9 DIAPHRAGMATIC HERNIA W/OUT OBSTRUCTION OR GANGRENE: ICD-10-CM

## 2025-04-01 PROCEDURE — 99213 OFFICE O/P EST LOW 20 MIN: CPT

## 2025-04-28 ENCOUNTER — RX RENEWAL (OUTPATIENT)
Age: 70
End: 2025-04-28

## 2025-05-23 ENCOUNTER — NON-APPOINTMENT (OUTPATIENT)
Age: 70
End: 2025-05-23

## 2025-06-26 ENCOUNTER — NON-APPOINTMENT (OUTPATIENT)
Age: 70
End: 2025-06-26

## 2025-07-01 ENCOUNTER — APPOINTMENT (OUTPATIENT)
Dept: NEUROLOGY | Facility: CLINIC | Age: 70
End: 2025-07-01
Payer: MEDICARE

## 2025-07-01 VITALS
SYSTOLIC BLOOD PRESSURE: 135 MMHG | DIASTOLIC BLOOD PRESSURE: 88 MMHG | OXYGEN SATURATION: 99 % | HEIGHT: 64 IN | RESPIRATION RATE: 20 BRPM | WEIGHT: 135 LBS | BODY MASS INDEX: 23.05 KG/M2 | HEART RATE: 78 BPM

## 2025-07-01 PROCEDURE — 99213 OFFICE O/P EST LOW 20 MIN: CPT

## 2025-07-02 ENCOUNTER — APPOINTMENT (OUTPATIENT)
Dept: PULMONOLOGY | Facility: CLINIC | Age: 70
End: 2025-07-02
Payer: MEDICARE

## 2025-07-02 VITALS
HEART RATE: 100 BPM | OXYGEN SATURATION: 93 % | WEIGHT: 137 LBS | SYSTOLIC BLOOD PRESSURE: 126 MMHG | HEIGHT: 64 IN | BODY MASS INDEX: 23.39 KG/M2 | DIASTOLIC BLOOD PRESSURE: 68 MMHG

## 2025-07-02 PROBLEM — Z12.2 SCREENING FOR LUNG CANCER: Status: ACTIVE | Noted: 2025-07-02

## 2025-07-02 PROCEDURE — 99204 OFFICE O/P NEW MOD 45 MIN: CPT

## 2025-07-02 PROCEDURE — G2211 COMPLEX E/M VISIT ADD ON: CPT

## 2025-07-02 RX ORDER — AMITRIPTYLINE HYDROCHLORIDE 10 MG/1
10 TABLET, FILM COATED ORAL
Qty: 180 | Refills: 1 | Status: ACTIVE | COMMUNITY
Start: 1900-01-01 | End: 1900-01-01

## 2025-07-02 RX ORDER — TIOTROPIUM BROMIDE INHALATION SPRAY 3.12 UG/1
2.5 SPRAY, METERED RESPIRATORY (INHALATION) DAILY
Qty: 1 | Refills: 1 | Status: ACTIVE | COMMUNITY
Start: 2025-07-02 | End: 1900-01-01

## 2025-07-07 LAB
BASOPHILS # BLD AUTO: 0.03 K/UL
BASOPHILS NFR BLD AUTO: 0.2 %
EOSINOPHIL # BLD AUTO: 0 K/UL
EOSINOPHIL NFR BLD AUTO: 0 %
HCT VFR BLD CALC: 46.4 %
HGB BLD-MCNC: 15.4 G/DL
IMM GRANULOCYTES NFR BLD AUTO: 0.8 %
LYMPHOCYTES # BLD AUTO: 1.84 K/UL
LYMPHOCYTES NFR BLD AUTO: 12.4 %
MAN DIFF?: NORMAL
MCHC RBC-ENTMCNC: 32.9 PG
MCHC RBC-ENTMCNC: 33.2 G/DL
MCV RBC AUTO: 99.1 FL
MONOCYTES # BLD AUTO: 1.16 K/UL
MONOCYTES NFR BLD AUTO: 7.8 %
NEUTROPHILS # BLD AUTO: 11.73 K/UL
NEUTROPHILS NFR BLD AUTO: 78.8 %
PLATELET # BLD AUTO: 252 K/UL
PMV BLD AUTO: 0 /100 WBCS
PMV BLD: 11.9 FL
RBC # BLD: 4.68 M/UL
RBC # FLD: 12.8 %
WBC # FLD AUTO: 14.88 K/UL

## 2025-07-10 ENCOUNTER — APPOINTMENT (OUTPATIENT)
Dept: PULMONOLOGY | Facility: HOSPITAL | Age: 70
End: 2025-07-10

## 2025-07-10 ENCOUNTER — OUTPATIENT (OUTPATIENT)
Dept: OUTPATIENT SERVICES | Facility: HOSPITAL | Age: 70
LOS: 1 days | End: 2025-07-10
Payer: MEDICARE

## 2025-07-10 DIAGNOSIS — Z98.890 OTHER SPECIFIED POSTPROCEDURAL STATES: Chronic | ICD-10-CM

## 2025-07-10 DIAGNOSIS — R06.02 SHORTNESS OF BREATH: ICD-10-CM

## 2025-07-10 DIAGNOSIS — Z98.82 BREAST IMPLANT STATUS: Chronic | ICD-10-CM

## 2025-07-10 DIAGNOSIS — Z90.710 ACQUIRED ABSENCE OF BOTH CERVIX AND UTERUS: Chronic | ICD-10-CM

## 2025-07-10 DIAGNOSIS — J44.9 CHRONIC OBSTRUCTIVE PULMONARY DISEASE, UNSPECIFIED: ICD-10-CM

## 2025-07-10 PROCEDURE — 94727 GAS DIL/WSHOT DETER LNG VOL: CPT | Mod: 26

## 2025-07-10 PROCEDURE — 94729 DIFFUSING CAPACITY: CPT

## 2025-07-10 PROCEDURE — 94729 DIFFUSING CAPACITY: CPT | Mod: 26

## 2025-07-10 PROCEDURE — 94727 GAS DIL/WSHOT DETER LNG VOL: CPT

## 2025-07-10 PROCEDURE — 94060 EVALUATION OF WHEEZING: CPT | Mod: 26

## 2025-07-10 PROCEDURE — 94070 EVALUATION OF WHEEZING: CPT

## 2025-07-10 PROCEDURE — 94664 DEMO&/EVAL PT USE INHALER: CPT

## 2025-07-11 DIAGNOSIS — J44.9 CHRONIC OBSTRUCTIVE PULMONARY DISEASE, UNSPECIFIED: ICD-10-CM

## 2025-07-11 DIAGNOSIS — R06.02 SHORTNESS OF BREATH: ICD-10-CM

## 2025-07-14 ENCOUNTER — APPOINTMENT (OUTPATIENT)
Dept: CARDIOTHORACIC SURGERY | Facility: CLINIC | Age: 70
End: 2025-07-14
Payer: MEDICARE

## 2025-07-14 VITALS — BODY MASS INDEX: 23.39 KG/M2 | WEIGHT: 137 LBS | HEIGHT: 64 IN

## 2025-07-14 PROBLEM — F17.210 CIGARETTE SMOKER: Status: ACTIVE | Noted: 2025-07-14

## 2025-07-14 PROCEDURE — G0296 VISIT TO DETERM LDCT ELIG: CPT

## 2025-08-11 ENCOUNTER — OUTPATIENT (OUTPATIENT)
Dept: OUTPATIENT SERVICES | Facility: HOSPITAL | Age: 70
LOS: 1 days | End: 2025-08-11
Payer: MEDICARE

## 2025-08-11 ENCOUNTER — RESULT REVIEW (OUTPATIENT)
Age: 70
End: 2025-08-11

## 2025-08-11 DIAGNOSIS — Z98.82 BREAST IMPLANT STATUS: Chronic | ICD-10-CM

## 2025-08-11 DIAGNOSIS — Z98.890 OTHER SPECIFIED POSTPROCEDURAL STATES: Chronic | ICD-10-CM

## 2025-08-11 DIAGNOSIS — Z90.710 ACQUIRED ABSENCE OF BOTH CERVIX AND UTERUS: Chronic | ICD-10-CM

## 2025-08-11 DIAGNOSIS — Z00.8 ENCOUNTER FOR OTHER GENERAL EXAMINATION: ICD-10-CM

## 2025-08-11 DIAGNOSIS — F17.210 NICOTINE DEPENDENCE, CIGARETTES, UNCOMPLICATED: ICD-10-CM

## 2025-08-11 PROCEDURE — 71271 CT THORAX LUNG CANCER SCR C-: CPT

## 2025-08-11 PROCEDURE — 71271 CT THORAX LUNG CANCER SCR C-: CPT | Mod: 26

## 2025-08-12 ENCOUNTER — APPOINTMENT (OUTPATIENT)
Dept: PULMONOLOGY | Facility: CLINIC | Age: 70
End: 2025-08-12
Payer: MEDICARE

## 2025-08-12 VITALS
HEART RATE: 84 BPM | WEIGHT: 137 LBS | RESPIRATION RATE: 14 BRPM | DIASTOLIC BLOOD PRESSURE: 84 MMHG | OXYGEN SATURATION: 96 % | SYSTOLIC BLOOD PRESSURE: 130 MMHG | BODY MASS INDEX: 23.52 KG/M2

## 2025-08-12 DIAGNOSIS — F17.210 NICOTINE DEPENDENCE, CIGARETTES, UNCOMPLICATED: ICD-10-CM

## 2025-08-12 DIAGNOSIS — J44.9 CHRONIC OBSTRUCTIVE PULMONARY DISEASE, UNSPECIFIED: ICD-10-CM

## 2025-08-12 DIAGNOSIS — R06.02 SHORTNESS OF BREATH: ICD-10-CM

## 2025-08-12 DIAGNOSIS — R91.8 OTHER NONSPECIFIC ABNORMAL FINDING OF LUNG FIELD: ICD-10-CM

## 2025-08-12 PROCEDURE — G2211 COMPLEX E/M VISIT ADD ON: CPT

## 2025-08-12 PROCEDURE — 99214 OFFICE O/P EST MOD 30 MIN: CPT
